# Patient Record
Sex: FEMALE | Race: WHITE | Employment: FULL TIME | URBAN - METROPOLITAN AREA
[De-identification: names, ages, dates, MRNs, and addresses within clinical notes are randomized per-mention and may not be internally consistent; named-entity substitution may affect disease eponyms.]

---

## 2017-05-11 LAB
HBSAG, EXTERNAL: NEGATIVE
HIV, EXTERNAL: NON REACTIVE
RUBELLA, EXTERNAL: NORMAL
TYPE, ABO & RH, EXTERNAL: NORMAL

## 2017-07-25 LAB
ANTIBODY SCREEN, EXTERNAL: NEGATIVE
T. PALLIDUM, EXTERNAL: NEGATIVE

## 2017-09-14 LAB — GRBS, EXTERNAL: NEGATIVE

## 2017-09-22 ENCOUNTER — HOSPITAL ENCOUNTER (EMERGENCY)
Age: 32
Discharge: HOME | End: 2017-09-22
Attending: OBSTETRICS & GYNECOLOGY | Admitting: OBSTETRICS & GYNECOLOGY
Payer: COMMERCIAL

## 2017-09-22 VITALS
RESPIRATION RATE: 14 BRPM | HEIGHT: 66 IN | TEMPERATURE: 98.4 F | SYSTOLIC BLOOD PRESSURE: 129 MMHG | BODY MASS INDEX: 24.43 KG/M2 | DIASTOLIC BLOOD PRESSURE: 85 MMHG | HEART RATE: 76 BPM | WEIGHT: 152 LBS

## 2017-09-22 PROCEDURE — 99283 EMERGENCY DEPT VISIT LOW MDM: CPT

## 2017-09-22 RX ORDER — SERTRALINE HYDROCHLORIDE 50 MG/1
50 TABLET, FILM COATED ORAL DAILY
COMMUNITY

## 2017-09-22 NOTE — IP AVS SNAPSHOT
[address] 
[phone] Patient: Chago Levin MRN: [id number] QXY:4[birth year] You are allergic to the following No active allergies Recent Documentation Height Weight Breastfeeding? BMI OB Status Smoking Status 1.676 m 68.9 kg No 24.53 kg/m2 Pregnant Never Smoker Emergency Contacts Name Discharge Info Relation Home Work Mobile Kole Welsh DISCHARGE CAREGIVER [3] Spouse [3] [phone] About your hospitalization You were admitted on:  N/A You last received care in the:  [facility] 3 LABOR & DELIVERY You were discharged on:  September 22, 2017 Unit phone number:  [phone] Why you were hospitalized Your primary diagnosis was:  Not on File Your diagnoses also included:  Pregnancy Providers Seen During Your Hospitalizations Provider Role Specialty Primary office phone Merline Dayhoff, MD Attending Provider Obstetrics & Gynecology [phone] Your Primary Care Physician (PCP) Primary Care Physician Office Phone Office Fax Silvana Quiroga [phone] Follow-up Information Follow up With Details Comments Contact Info Merline Dayhoff, MD   [address] [facility] 400 1701 [facility] 
[phone] Current Discharge Medication List  
  
CONTINUE these medications which have NOT CHANGED Dose & Instructions Dispensing Information Comments Morning Noon Evening Bedtime PRENATAL #2 PO Your last dose was: Your next dose is: Take  by mouth. Refills:  0 PROBIOTIC (S.BOULARDII) PO Your last dose was: Your next dose is: Take  by mouth. Refills:  0  
     
   
   
   
  
 ZOLOFT 50 mg tablet Generic drug:  sertraline Your last dose was: Your next dose is: Dose:  50 mg Take 50 mg by mouth daily. Refills:  0 STOP taking these medications   
 ibuprofen 600 mg tablet Commonly known as:  MOTRIN  
   
  
 omeprazole 10 mg capsule Commonly known as:  PRILOSEC  
   
  
 oxyCODONE-acetaminophen 5-325 mg per tablet Commonly known as:  PERCOCET Discharge Instructions Week 37 of Your Pregnancy: Care Instructions Your Care Instructions You are near the end of your pregnancyand you're probably pretty uncomfortable. It may be harder to walk around. Lying down probably isn't comfortable either. You may have trouble getting to sleep or staying asleep. Most women deliver their babies between 40 and 41 weeks. This is a good time to think about packing a bag for the hospital with items you'll need. Then you'll be ready when labor starts. Follow-up care is a key part of your treatment and safety. Be sure to make and go to all appointments, and call your doctor if you are having problems. It's also a good idea to know your test results and keep a list of the medicines you take. How can you care for yourself at home? Learn about breastfeeding · Breastfeeding is best for your baby and good for you. · Breast milk has antibodies to help your baby fight infections. · Mothers who breastfeed often lose weight faster, because making milk burns calories. · Learning the best ways to hold your baby will make breastfeeding easier. · Let your partner bathe and diaper the baby to keep your partner from feeling left out. Snuggle together when you breastfeed. · You may want to learn how to use a breast pump and store your milk. · If you choose to bottle feed, make the feeding feel like breastfeeding so you can bond with your baby. Always hold your baby and the bottle. Do not prop bottles or let your baby fall asleep with a bottle. Learn about crying · It is common for babies to cry for 1 to 3 hours a day. Some cry more, some cry less. · Babies don't cry to make you upset or because you are a bad parent. · Crying is how your baby communicates. Your baby may be hungry; have gas; need a diaper change; or feel cold, warm, tired, lonely, or tense. Sometimes babies cry for unknown reasons. · If you respond to your baby's needs, he or she will learn to trust you. · Try to stay calm when your baby cries. Your baby may get more upset if he or she senses that you are upset. Know how to care for your  · Your baby's umbilical cord stump will drop off on its own, usually between 1 and 2 weeks. To care for your baby's umbilical cord area: ¨ Clean the area at the bottom of the cord 2 or 3 times a day. ¨ Pay special attention to the area where the cord attaches to the skin. ¨ Keep the diaper folded below the cord. ¨ Use a damp washcloth or cotton ball to sponge bathe your baby until the stump has come off. · Your baby's first dark stool is called meconium. After the meconium is passed, your baby will develop his or her own bowel pattern. ¨ Some babies, especially  babies, have several bowel movements a day. Others have one or two a day, or one every 2 to 3 days. ¨  babies often have loose, yellow stools. Formula-fed babies have more formed stools. ¨ If your baby's stools look like little pellets, he or she is constipated. After 2 days of constipation, call your baby's doctor. · If your baby will be circumcised, you can care for him at home. ¨ Gently rinse his penis with warm water after every diaper change. Do not try to remove the film that forms on the penis. This film will go away on its own. Pat dry. ¨ Put petroleum ointment, such as Vaseline, on the area of the diaper that will touch your baby's penis. This will keep the diaper from sticking to your baby. ¨ Ask the doctor about giving your baby acetaminophen (Tylenol) for pain. Where can you learn more? Go to http://liu-erika.info/. Enter 68 21 97 in the search box to learn more about \"Week 37 of Your Pregnancy: Care Instructions. \" Current as of: March 16, 2017 Content Version: 11.3 © 5481-6140 [employer]. Care instructions adapted under license by [employer] (which disclaims liability or warranty for this information). If you have questions about a medical condition or this instruction, always ask your healthcare professional. [place] [address] any warranty or liability for your use of this information. Counting Your Baby's Kicks: Care Instructions Your Care Instructions Counting your baby's kicks is one way your doctor can tell that your baby is healthy. Most womenespecially in a first pregnancyfeel their baby move for the first time between 16 and 22 weeks. The movement may feel like flutters rather than kicks. Your baby may move more at certain times of the day. When you are active, you may notice less kicking than when you are resting. At your prenatal visits, your doctor will ask whether the baby is active. In your last trimester, your doctor may ask you to count the number of times you feel your baby move. Follow-up care is a key part of your treatment and safety. Be sure to make and go to all appointments, and call your doctor if you are having problems. It's also a good idea to know your test results and keep a list of the medicines you take. How do you count fetal kicks? · A common method of checking your baby's movement is to count the number of kicks or moves you feel in 1 hour. Ten movements (such as kicks, flutters, or rolls) in 1 hour are normal. Some doctors suggest that you count in the morning until you get to 10 movements. Then you can quit for that day and start again the next day. · Pick your baby's most active time of day to count. This may be any time from morning to evening. · If you do not feel 10 movements in an hour, your baby may be sleeping. Wait for the next hour and count again. When should you call for help? Call your doctor now or seek immediate medical care if: 
· You noticed that your baby has stopped moving or is moving much less than normal. 
Watch closely for changes in your health, and be sure to contact your doctor if you have any problems. Where can you learn more? Go to http://liu-erika.info/. Enter C981 in the search box to learn more about \"Counting Your Baby's Kicks: Care Instructions. \" Current as of: 2017 Content Version: 11.3 © 6931-6894 [employer]. Care instructions adapted under license by [employer] (which disclaims liability or warranty for this information). If you have questions about a medical condition or this instruction, always ask your healthcare professional. [place] [address] any warranty or liability for your use of this information.  Labor: Care Instructions Your Care Instructions  labor is the start of labor between 20 and 36 weeks of pregnancy. A full-term pregnancy lasts 37 to 42 weeks. In labor, the uterus contracts to open the cervix. This is the first stage of childbirth.  labor can be caused by a problem with the baby, the mother, or both. Often the cause is not known. In some cases, doctors use medicines to try to delay labor until 29 or more weeks of pregnancy. By this time, a baby has grown enough so that problems are not likely. In some casessuch as with a serious infectionit is healthier for the baby to be born early. Your treatment will depend on how far along you are in your pregnancy and on your health and your baby's health. Follow-up care is a key part of your treatment and safety.  Be sure to make and go to all appointments, and call your doctor if you are having problems. It's also a good idea to know your test results and keep a list of the medicines you take. How can you care for yourself at home? · If your doctor prescribed medicines, take them exactly as directed. Call your doctor if you think you are having a problem with your medicine. · Rest until your doctor advises you about activity. He or she will tell you if you should stay in bed most of the time. You may need to arrange for  if you have young children. · Do not have sexual intercourse unless your doctor says it is safe. · Use pads, not tampons, if you have vaginal bleeding. · Make sure to drink plenty of fluids. Dehydration can lead to contractions. If you have kidney, heart, or liver disease and have to limit fluids, talk with your doctor before you increase the amount of fluids you drink. · Do not smoke or allow others to smoke around you. If you need help quitting, talk to your doctor about stop-smoking programs and medicines. These can increase your chances of quitting for good. When should you call for help? Call 911 anytime you think you may need emergency care. For example, call if: 
· You passed out (lost consciousness). · You have severe vaginal bleeding. · You have severe pain in your belly or pelvis. · You have had fluid gushing or leaking from your vagina and you know or think the umbilical cord is bulging into your vagina. If this happens, immediately get down on your knees so your rear end (buttocks) is higher than your head. This will decrease the pressure on the cord until help arrives. Call your doctor now or seek immediate medical care if: 
· You have signs of preeclampsia, such as: 
¨ Sudden swelling of your face, hands, or feet. ¨ New vision problems (such as dimness or blurring). ¨ A severe headache. · You have any vaginal bleeding. · You have belly pain or cramping. · You have a fever. · You have had regular contractions (with or without pain) for an hour. This means that you have 6 or more within 1 hour after you change your position and drink fluids. · You have a sudden release of fluid from the vagina. · You have low back pain or pelvic pressure that does not go away. · You notice that your baby has stopped moving or is moving much less than normal. 
Watch closely for changes in your health, and be sure to contact your doctor if you have any problems. Where can you learn more? Go to http://liu-erika.info/. Enter Q400 in the search box to learn more about \" Labor: Care Instructions. \" Current as of: 2017 Content Version: 11.3 © 9685-9384 [employer] Care instructions adapted under license by [employer] (which disclaims liability or warranty for this information). If you have questions about a medical condition or this instruction, always ask your healthcare professional. [address] any warranty or liability for your use of this information. Discharge Orders None Introducing [facility]! [facility] introduces [employer] patient portal. Now you can access parts of your medical record, email your doctor's office, and request medication refills online. 1. In your internet browser, go to [email or web address]. Organically Maid/Shoot Extreme 2. Click on the First Time User? Click Here link in the Sign In box. You will see the New Member Sign Up page. 3. Enter your [employer] Access Code exactly as it appears below. You will not need to use this code after youve completed the sign-up process. If you do not sign up before the expiration date, you must request a new code. · [employer] Access Code: [email or web address] Expires: 2017  4:56 PM 
 
4. Enter the last four digits of your Social Security Number (xxxx) and Date of Birth (mm/dd/yyyy) as indicated and click Submit. You will be taken to the next sign-up page. 5. Create a [employer] ID. This will be your [employer] login ID and cannot be changed, so think of one that is secure and easy to remember. 6. Create a [employer] password. You can change your password at any time. 7. Enter your Password Reset Question and Answer. This can be used at a later time if you forget your password. 8. Enter your e-mail address. You will receive e-mail notification when new information is available in [address] 9. Click Sign Up. You can now view and download portions of your medical record. 10. Click the Download Summary menu link to download a portable copy of your medical information. If you have questions, please visit the Frequently Asked Questions section of the [employer] website. Remember, [employer] is NOT to be used for urgent needs. For medical emergencies, dial 911. Now available from your iPhone and Android! General Information Please provide this summary of care documentation to your next provider. Patient Signature:  ____________________________________________________________ Date:  ____________________________________________________________  
  
Peggy Quiroz Provider Signature:  ____________________________________________________________ Date:  ____________________________________________________________

## 2017-09-22 NOTE — H&P
History of Present Illness:  Pt is 28 yo  at 39 2/7 with cervix now 80/3/-2 and ucs every 3 min. GBS neg. SGA at 13%. Sent to L&D to r/o labor. 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 -       Previous Tobacco Use: Signed On - 2016  Smoked Tobacco Use:  Former smoker  Smokeless Tobacco Use:  Never     Counseled to quit/cut down:  no  Passive smoke exposure:  no  Drug use:  no  HIV high-risk behavior:  no  Caffeine use:  0 drinks per day    Previous Alcohol Use: Signed On - 2016  Alcohol use:  no  Exercise:  no  Seatbelt use:  100 %  Sun Exposure:  occasionally    Family History Risk Factors:     Family History of MI in females < 72years old:  no     Family History of MI in males < 54years old:  no    Dietary Counseling: no    PAP Smear History:     Date of Last PAP Smear:  2015      Review of Systems        See HPI    Except as noted in the HPI, the review of systems is negative for General, Breast, , Resp, GI, Endo, MS, Psych and Heme.       Vital Signs    Blood Pressure: 122 / 80    Weight:  152.2 pounds      Physical Exam     General           General appearance:  no acute distress    Head           Inspection:   normal    Eyes           External:   EOM intact    ENT           Dental:   adequate dentition    Chest           Lungs:  clear to auscultation          Heart:  regular rate and rhythm    Extremeties           Extremeties:  0 edema    Neurological           Reflexes:  2+ and symmetric with no pathological reflexes    Psych Orientation:  oriented to time, place, and person          Mood:  no appearance of anxiety, depression, or agitation    Lymph           Inguinal:  no inguinal adenopathy    Skin           Inspection:  no rashes, suspicious lesions, or ulcerations    Abdomen           Abdomen:  gravid          Fundal Height:  30    Pelvic Exam           EGBUS:  no lesions          Vagina:  normal appearing without lesions or discharge          Uterus:  gravid          Cervix:  no lesions or discharge                  Dilation: : 3                  Effacement:  80%                  Station:  -2                  Presentation:  cephalic    Allergies    This patient has no known allergies. Medications Removed from Medication List        Impression & Recommendations:    Problem # 1:  Normal pregnancy multigravida (ICD-V22.1) (LYK05-X11.71)  Pt is 26 yo  at 39 2/7 with cervix now 80/3/-2 and ucs every 3 min. GBS neg. SGA at 13%. Sent to L&D to r/o labor. Problem # 2:  Threatened PTL (KRW-354.92) (MSZ56-D77.03)  Pt is 26 yo  at 39 2/7 with cervix now 80/3/-2 and ucs every 3 min. GBS neg. SGA at 13%. Sent to L&D to r/o labor. Medications (at conclusion of this visit)    08/10/2017 ZOLOFT 50 MG ORAL TABS (SERTRALINE HCL) 1/2 tablet daily x1 wk. 1 tablet daily if sxs persist.  2017 PNV-DHA CAPS (PRENAT W/O M-YX-[id number]-FA-DHA CAPS)           LABORATORY DATA   TEST DATE RESULT   Group B Strep culture 2017 Negative                                   (Group B Strep Culture Result Field)   Blood Type 2017 A                                             (Blood Type Result Field)   Rh 2017 Positive                                   (Rh Result Field)   Rhogam Inj Given 2016 *   Tdap Vaccine Given 2017 Vacc.  [facility]   Antibody Screen 2017 Negative   Rubella  [facility] Reference Ranges On or After 3/10/14                  <0.90              Non-immune      0.90 - 0.99 Equivocal      >0.99              Immune    [facility] Reference Ranges  Before 3/10/14           <5                 Non-immune             5 - 9               Equivocal            >9                 Immune  [facility] Reference Ranges       < Or = 0.90       Negative             0.91-1.09          Equivocal            > Or = 1.10       Positive   05/11/2017     1.86     TPA (T Pallidum Antibodies) 07/25/2017 Negative   Serology (RPR) 06/23/2016 *   HBsAg 05/11/2017 Negative   HIV 05/11/2017 Non Reactive   Hemoglobin 07/25/2017 11.2   Hematocrit 07/25/2017 35.3   Platelets 04/12/0259 287 X10E3/UL   TSH 06/23/2016 *   Urine Culture 05/11/2017 Negative   GC DNA Probe 05/11/2017 Negative   Chlamydia DNA 05/11/2017 Negative   PAP 11/19/2015 NIL   Flu Vaccine Given 06/23/2016 *   HGBA1C 06/23/2016 *   HGB Electro     T4, Free 06/23/2016 *   BG Fasting 06/23/2016 *   GTT 1H 50G 07/25/2017 128   GTT 1H 100G 06/23/2016 *   GTT 2H 100G 06/23/2016 *   GTT 3H 100G 06/23/2016 *   Glucose Plasma 06/23/2016 *   CF Accept or Decline 11/19/2015 accepted   CF Screen Result 11/19/2015 Negative   Nuchal Trans 06/02/2017 3.99^3. 99 mm&millimeters   AFP Only 01/12/2016 *Screen Negative*   Tetra 05/11/2017 *Screen Negative*   AFP Serum 06/23/2016 *   CVS 06/23/2016 *   AFP Amniotic 06/23/2016 *   Amnio Karyo 06/23/2016 *   FISH 06/23/2016 *   GC Culture 06/23/2016 *   Chlamydia Cult 06/23/2016 *   Ureaplasma     Mycoplasma     WBC 05/11/2017 8.0 X10E3/UL   RBC 05/11/2017 3.85 X10E6/UL   MCV 05/11/2017 90   MCH 05/11/2017 29.1   MCHC RBC 05/11/2017 32.3     ULTRASOUND DATA   TEST DATE RESULT   Estimated Fetal Weight 09/14/2017 2240.81180499^2241 g&grams                                     Weight % 09/14/2017 13^13% %&percent                                                YENY 09/14/2017 9.8^9.8 cm&centimeters                    BPP 09/14/2017 8^8 [n/a]&Not applicable   Cervical Length (mm) 12/22/2015 39.000     ]      Electronically signed by Nicole Doherty [facility]  MD on 09/22/2017 at 2:58 PM    ________________________________________________________________________

## 2017-09-22 NOTE — IP AVS SNAPSHOT
[address] 
[phone] Patient: Caridad Miller MRN: [id number] CHRIS:[birth year] Current Discharge Medication List  
  
CONTINUE these medications which have NOT CHANGED Dose & Instructions Dispensing Information Comments Morning Noon Evening Bedtime PRENATAL #2 PO Your last dose was: Your next dose is: Take  by mouth. Refills:  0 PROBIOTIC (S.BOULARDII) PO Your last dose was: Your next dose is: Take  by mouth. Refills:  0  
     
   
   
   
  
 ZOLOFT 50 mg tablet Generic drug:  sertraline Your last dose was: Your next dose is:    
   
   
 Dose:  50 mg Take 50 mg by mouth daily. Refills:  0 STOP taking these medications   
 ibuprofen 600 mg tablet Commonly known as:  MOTRIN  
   
  
 omeprazole 10 mg capsule Commonly known as:  PRILOSEC  
   
  
 oxyCODONE-acetaminophen 5-325 mg per tablet Commonly known as:  PERCOCET

## 2017-09-22 NOTE — PROGRESS NOTES
1525 Patient presents to labor and delivery with complaints of painful, irregular contractions. Patient placed on the monitor. History, allergies, medications, and plan of care reviewed. 1545 Patient PO hydrating. Will continue to monitor. [address] Dr. Shayy Toure visits patient in the room. SVE done. Patient able to be discharged home. Patient received and verbalized understanding of discharge instructions. No questions or concerns at this time.

## 2017-09-22 NOTE — PROGRESS NOTES
Ante Partum Progress Note    Sara Se  89G2O    Assessment: 36w2d   Active Problems:    Pregnancy (2016)     ---r/o PTL, no cervical change over >6hours, occ painful UCs        Plan:  Dc home              Labor precautions reviewed      Patient states she has no new complaints. Good fetal mvm, no pih sxs    Orders/Charges: Medium and Non Stress Test        Vitals:  Visit Vitals    /85    Pulse 76    Temp 98.4 °F (36.9 °C)    Resp 14    Ht 5' 6\" (1.676 m)    Wt 68.9 kg (152 lb)    Breastfeeding No    BMI 24.53 kg/m2     Temp (24hrs), Av.4 °F (36.9 °C), Min:98.4 °F (36.9 °C), Max:98.4 °F (36.9 °C)    Patient Vitals for the past 24 hrs:   BP Temp Pulse Resp Height Weight   17 1637 129/85 - 76 - - -   17 1615 (!) 141/93 - 69 - - -   17 1606 131/88 - 73 - - -   17 1557 135/84 - 66 - - -   17 1542 152/85 98.4 °F (36.9 °C) 74 14 - -   17 1540 - - - - 5' 6\" (1.676 m) 68.9 kg (152 lb)   17 1535 (!) 142/99 - 80 - - -       Last 24hr Input/Output:  No intake or output data in the 24 hours ending 17 1654     Non stress test:  Reactive, An NST was performed and was reactive. The baseline FHR was 145 . Moderate baseline  variability was noted. Accelerations of sufficient amplitude and duration were noted. There were no decelerations noted. Patient Vitals for the past 4 hrs: Mode Fetal Heart Rate Variability Decelerations Accelerations RN Reviewed Strip? Non Stress Test   17 1600 External 145 6-25 BPM None Yes Yes Reactive   17 1533 External - - - - - -       Uterine Activity: Mild, Irregular occ  Moderate     Exam:  Patient without distress. Abdomen, fundus soft non-tender     Extremities, no redness or tenderness     No current facility-administered medications for this encounter.                     Additional Exam: Dilation: 2-3  cm, Effacement: 75%  Not Checked and Station: -2    Labs:     No results found for this or any previous visit (from the past 24 hour(s)). No results for input(s): GLUCPOC in the last 72 hours.     No lab exists for component: [place]

## 2017-09-22 NOTE — DISCHARGE INSTRUCTIONS
Week 37 of Your Pregnancy: Care Instructions  Your Care Instructions    You are near the end of your pregnancy--and you're probably pretty uncomfortable. It may be harder to walk around. Lying down probably isn't comfortable either. You may have trouble getting to sleep or staying asleep. Most women deliver their babies between 40 and 41 weeks. This is a good time to think about packing a bag for the hospital with items you'll need. Then you'll be ready when labor starts. Follow-up care is a key part of your treatment and safety. Be sure to make and go to all appointments, and call your doctor if you are having problems. It's also a good idea to know your test results and keep a list of the medicines you take. How can you care for yourself at home? Learn about breastfeeding  · Breastfeeding is best for your baby and good for you. · Breast milk has antibodies to help your baby fight infections. · Mothers who breastfeed often lose weight faster, because making milk burns calories. · Learning the best ways to hold your baby will make breastfeeding easier. · Let your partner bathe and diaper the baby to keep your partner from feeling left out. Snuggle together when you breastfeed. · You may want to learn how to use a breast pump and store your milk. · If you choose to bottle feed, make the feeding feel like breastfeeding so you can bond with your baby. Always hold your baby and the bottle. Do not prop bottles or let your baby fall asleep with a bottle. Learn about crying  · It is common for babies to cry for 1 to 3 hours a day. Some cry more, some cry less. · Babies don't cry to make you upset or because you are a bad parent. · Crying is how your baby communicates. Your baby may be hungry; have gas; need a diaper change; or feel cold, warm, tired, lonely, or tense. Sometimes babies cry for unknown reasons. · If you respond to your baby's needs, he or she will learn to trust you.   · Try to stay calm when your baby cries. Your baby may get more upset if he or she senses that you are upset. Know how to care for your   · Your baby's umbilical cord stump will drop off on its own, usually between 1 and 2 weeks. To care for your baby's umbilical cord area:  ¨ Clean the area at the bottom of the cord 2 or 3 times a day. ¨ Pay special attention to the area where the cord attaches to the skin. ¨ Keep the diaper folded below the cord. ¨ Use a damp washcloth or cotton ball to sponge bathe your baby until the stump has come off. · Your baby's first dark stool is called meconium. After the meconium is passed, your baby will develop his or her own bowel pattern. ¨ Some babies, especially  babies, have several bowel movements a day. Others have one or two a day, or one every 2 to 3 days. ¨  babies often have loose, yellow stools. Formula-fed babies have more formed stools. ¨ If your baby's stools look like little pellets, he or she is constipated. After 2 days of constipation, call your baby's doctor. · If your baby will be circumcised, you can care for him at home. ¨ Gently rinse his penis with warm water after every diaper change. Do not try to remove the film that forms on the penis. This film will go away on its own. Pat dry. ¨ Put petroleum ointment, such as Vaseline, on the area of the diaper that will touch your baby's penis. This will keep the diaper from sticking to your baby. ¨ Ask the doctor about giving your baby acetaminophen (Tylenol) for pain. Where can you learn more? Go to http://liu-erika.info/. Enter 68 21 [id number] in the search box to learn more about \"Week 37 of Your Pregnancy: Care Instructions. \"  Current as of: 2017  Content Version: 11.3  © 5795-8482 [employer]. Care instructions adapted under license by [employer] (which disclaims liability or warranty for this information).  If you have questions about a medical condition or this instruction, always ask your healthcare professional. [address] any warranty or liability for your use of this information. Counting Your Baby's Kicks: Care Instructions  Your Care Instructions  Counting your baby's kicks is one way your doctor can tell that your baby is healthy. Most women--especially in a first pregnancy--feel their baby move for the first time between 16 and 22 weeks. The movement may feel like flutters rather than kicks. Your baby may move more at certain times of the day. When you are active, you may notice less kicking than when you are resting. At your prenatal visits, your doctor will ask whether the baby is active. In your last trimester, your doctor may ask you to count the number of times you feel your baby move. Follow-up care is a key part of your treatment and safety. Be sure to make and go to all appointments, and call your doctor if you are having problems. It's also a good idea to know your test results and keep a list of the medicines you take. How do you count fetal kicks? · A common method of checking your baby's movement is to count the number of kicks or moves you feel in 1 hour. Ten movements (such as kicks, flutters, or rolls) in 1 hour are normal. Some doctors suggest that you count in the morning until you get to 10 movements. Then you can quit for that day and start again the next day. · Pick your baby's most active time of day to count. This may be any time from morning to evening. · If you do not feel 10 movements in an hour, your baby may be sleeping. Wait for the next hour and count again. When should you call for help? Call your doctor now or seek immediate medical care if:  · You noticed that your baby has stopped moving or is moving much less than normal.  Watch closely for changes in your health, and be sure to contact your doctor if you have any problems. Where can you learn more?   Go to http://liu-erika.info/. Enter B901 in the search box to learn more about \"Counting Your Baby's Kicks: Care Instructions. \"  Current as of: 2017  Content Version: 11.3  © 5403-1120 [employer]. Care instructions adapted under license by [employer] (which disclaims liability or warranty for this information). If you have questions about a medical condition or this instruction, always ask your healthcare professional. [address] any warranty or liability for your use of this information.  Labor: Care Instructions  Your Care Instructions   labor is the start of labor between 21 and 36 weeks of pregnancy. A full-term pregnancy lasts 37 to 42 weeks. In labor, the uterus contracts to open the cervix. This is the first stage of childbirth.  labor can be caused by a problem with the baby, the mother, or both. Often the cause is not known. In some cases, doctors use medicines to try to delay labor until 29 or more weeks of pregnancy. By this time, a baby has grown enough so that problems are not likely. In some cases--such as with a serious infection--it is healthier for the baby to be born early. Your treatment will depend on how far along you are in your pregnancy and on your health and your baby's health. Follow-up care is a key part of your treatment and safety. Be sure to make and go to all appointments, and call your doctor if you are having problems. It's also a good idea to know your test results and keep a list of the medicines you take. How can you care for yourself at home? · If your doctor prescribed medicines, take them exactly as directed. Call your doctor if you think you are having a problem with your medicine. · Rest until your doctor advises you about activity. He or she will tell you if you should stay in bed most of the time.  You may need to arrange for  if you have young children. · Do not have sexual intercourse unless your doctor says it is safe. · Use pads, not tampons, if you have vaginal bleeding. · Make sure to drink plenty of fluids. Dehydration can lead to contractions. If you have kidney, heart, or liver disease and have to limit fluids, talk with your doctor before you increase the amount of fluids you drink. · Do not smoke or allow others to smoke around you. If you need help quitting, talk to your doctor about stop-smoking programs and medicines. These can increase your chances of quitting for good. When should you call for help? Call 911 anytime you think you may need emergency care. For example, call if:  · You passed out (lost consciousness). · You have severe vaginal bleeding. · You have severe pain in your belly or pelvis. · You have had fluid gushing or leaking from your vagina and you know or think the umbilical cord is bulging into your vagina. If this happens, immediately get down on your knees so your rear end (buttocks) is higher than your head. This will decrease the pressure on the cord until help arrives. Call your doctor now or seek immediate medical care if:  · You have signs of preeclampsia, such as:  ¨ Sudden swelling of your face, hands, or feet. ¨ New vision problems (such as dimness or blurring). ¨ A severe headache. · You have any vaginal bleeding. · You have belly pain or cramping. · You have a fever. · You have had regular contractions (with or without pain) for an hour. This means that you have 6 or more within 1 hour after you change your position and drink fluids. · You have a sudden release of fluid from the vagina. · You have low back pain or pelvic pressure that does not go away. · You notice that your baby has stopped moving or is moving much less than normal.  Watch closely for changes in your health, and be sure to contact your doctor if you have any problems. Where can you learn more?   Go to http://liu-erika.info/. Enter Q400 in the search box to learn more about \" Labor: Care Instructions. \"  Current as of: 2017  Content Version: 11.3  © 9000-9261 [employer]. Care instructions adapted under license by [employer] (which disclaims liability or warranty for this information). If you have questions about a medical condition or this instruction, always ask your healthcare professional. [place] [address] any warranty or liability for your use of this information.

## 2017-09-30 ENCOUNTER — ANESTHESIA EVENT (OUTPATIENT)
Dept: LABOR AND DELIVERY | Age: 32
End: 2017-09-30
Payer: COMMERCIAL

## 2017-09-30 ENCOUNTER — HOSPITAL ENCOUNTER (INPATIENT)
Age: 32
LOS: 3 days | Discharge: HOME | End: 2017-10-03
Attending: OBSTETRICS & GYNECOLOGY | Admitting: OBSTETRICS & GYNECOLOGY
Payer: COMMERCIAL

## 2017-09-30 ENCOUNTER — ANESTHESIA (OUTPATIENT)
Dept: LABOR AND DELIVERY | Age: 32
End: 2017-09-30
Payer: COMMERCIAL

## 2017-09-30 PROBLEM — O26.893 ABDOMINAL PAIN DURING PREGNANCY IN THIRD TRIMESTER: Status: ACTIVE | Noted: 2017-09-30

## 2017-09-30 PROBLEM — R10.9 ABDOMINAL PAIN DURING PREGNANCY IN THIRD TRIMESTER: Status: ACTIVE | Noted: 2017-09-30

## 2017-09-30 LAB
ERYTHROCYTE [DISTWIDTH] IN BLOOD BY AUTOMATED COUNT: 14.1 % (ref 11.5–14.5)
HCT VFR BLD AUTO: 39 % (ref 35–47)
HGB BLD-MCNC: 13.2 G/DL (ref 11.5–16)
MCH RBC QN AUTO: 29.9 PG (ref 26–34)
MCHC RBC AUTO-ENTMCNC: 33.8 G/DL (ref 30–36.5)
MCV RBC AUTO: 88.2 FL (ref 80–99)
PLATELET # BLD AUTO: 263 K/UL (ref 150–400)
RBC # BLD AUTO: 4.42 M/UL (ref 3.8–5.2)
WBC # BLD AUTO: 12.5 K/UL (ref 3.6–11)

## 2017-09-30 PROCEDURE — 75410000002 HC LABOR FEE PER 1 HR

## 2017-09-30 PROCEDURE — 36415 COLL VENOUS BLD VENIPUNCTURE: CPT | Performed by: OBSTETRICS & GYNECOLOGY

## 2017-09-30 PROCEDURE — A4300 CATH IMPL VASC ACCESS PORTAL: HCPCS

## 2017-09-30 PROCEDURE — 77030014125 HC TY EPDRL BBMI -B: Performed by: ANESTHESIOLOGY

## 2017-09-30 PROCEDURE — 00HU33Z INSERTION OF INFUSION DEVICE INTO SPINAL CANAL, PERCUTANEOUS APPROACH: ICD-10-PCS | Performed by: ANESTHESIOLOGY

## 2017-09-30 PROCEDURE — 99282 EMERGENCY DEPT VISIT SF MDM: CPT

## 2017-09-30 PROCEDURE — 74011000250 HC RX REV CODE- 250: Performed by: OBSTETRICS & GYNECOLOGY

## 2017-09-30 PROCEDURE — 85027 COMPLETE CBC AUTOMATED: CPT | Performed by: OBSTETRICS & GYNECOLOGY

## 2017-09-30 PROCEDURE — 74011250636 HC RX REV CODE- 250/636: Performed by: ANESTHESIOLOGY

## 2017-09-30 PROCEDURE — 65270000029 HC RM PRIVATE

## 2017-09-30 PROCEDURE — 77030007880 HC KT SPN EPDRL BBMI -B

## 2017-09-30 PROCEDURE — 3E0R3BZ INTRODUCTION OF ANESTHETIC AGENT INTO SPINAL CANAL, PERCUTANEOUS APPROACH: ICD-10-PCS | Performed by: ANESTHESIOLOGY

## 2017-09-30 PROCEDURE — 74011250636 HC RX REV CODE- 250/636: Performed by: OBSTETRICS & GYNECOLOGY

## 2017-09-30 RX ORDER — FENTANYL/BUPIVACAINE/NS/PF 2-1250MCG
1-16 PREFILLED PUMP RESERVOIR EPIDURAL CONTINUOUS
Status: DISCONTINUED | OUTPATIENT
Start: 2017-09-30 | End: 2017-10-03 | Stop reason: HOSPADM

## 2017-09-30 RX ORDER — SODIUM CHLORIDE 0.9 % (FLUSH) 0.9 %
5-10 SYRINGE (ML) INJECTION EVERY 8 HOURS
Status: DISCONTINUED | OUTPATIENT
Start: 2017-09-30 | End: 2017-10-01 | Stop reason: HOSPADM

## 2017-09-30 RX ORDER — FENTANYL CITRATE 50 UG/ML
100 INJECTION, SOLUTION INTRAMUSCULAR; INTRAVENOUS ONCE
Status: DISCONTINUED | OUTPATIENT
Start: 2017-09-30 | End: 2017-10-01 | Stop reason: HOSPADM

## 2017-09-30 RX ORDER — SODIUM CHLORIDE, SODIUM LACTATE, POTASSIUM CHLORIDE, CALCIUM CHLORIDE 600; 310; 30; 20 MG/100ML; MG/100ML; MG/100ML; MG/100ML
125 INJECTION, SOLUTION INTRAVENOUS CONTINUOUS
Status: DISCONTINUED | OUTPATIENT
Start: 2017-09-30 | End: 2017-10-03 | Stop reason: HOSPADM

## 2017-09-30 RX ORDER — TERBUTALINE SULFATE 1 MG/ML
0.25 INJECTION SUBCUTANEOUS AS NEEDED
Status: DISCONTINUED | OUTPATIENT
Start: 2017-09-30 | End: 2017-09-30

## 2017-09-30 RX ORDER — BUPIVACAINE HYDROCHLORIDE 5 MG/ML
30 INJECTION, SOLUTION EPIDURAL; INTRACAUDAL AS NEEDED
Status: DISCONTINUED | OUTPATIENT
Start: 2017-09-30 | End: 2017-10-01 | Stop reason: HOSPADM

## 2017-09-30 RX ORDER — FENTANYL/BUPIVACAINE/NS/PF 2-1250MCG
1-17 PREFILLED PUMP RESERVOIR EPIDURAL CONTINUOUS
Status: DISCONTINUED | OUTPATIENT
Start: 2017-09-30 | End: 2017-10-01 | Stop reason: HOSPADM

## 2017-09-30 RX ORDER — NALBUPHINE HYDROCHLORIDE 10 MG/ML
10 INJECTION, SOLUTION INTRAMUSCULAR; INTRAVENOUS; SUBCUTANEOUS
Status: DISCONTINUED | OUTPATIENT
Start: 2017-09-30 | End: 2017-10-01 | Stop reason: HOSPADM

## 2017-09-30 RX ORDER — NALOXONE HYDROCHLORIDE 0.4 MG/ML
0.4 INJECTION, SOLUTION INTRAMUSCULAR; INTRAVENOUS; SUBCUTANEOUS AS NEEDED
Status: DISCONTINUED | OUTPATIENT
Start: 2017-09-30 | End: 2017-10-01 | Stop reason: HOSPADM

## 2017-09-30 RX ORDER — SODIUM CHLORIDE 0.9 % (FLUSH) 0.9 %
5-10 SYRINGE (ML) INJECTION AS NEEDED
Status: DISCONTINUED | OUTPATIENT
Start: 2017-09-30 | End: 2017-10-01 | Stop reason: HOSPADM

## 2017-09-30 RX ORDER — ACETAMINOPHEN 325 MG/1
TABLET ORAL
COMMUNITY

## 2017-09-30 RX ADMIN — SODIUM CHLORIDE, SODIUM LACTATE, POTASSIUM CHLORIDE, AND CALCIUM CHLORIDE 125 ML/HR: 600; 310; 30; 20 INJECTION, SOLUTION INTRAVENOUS at 17:40

## 2017-09-30 RX ADMIN — FENTANYL 0.2 MG/100ML-BUPIV 0.125%-NACL 0.9% EPIDURAL INJ 11 ML/HR: 2/0.125 SOLUTION at 22:48

## 2017-09-30 RX ADMIN — NALBUPHINE HYDROCHLORIDE 10 MG: 10 INJECTION, SOLUTION INTRAMUSCULAR; INTRAVENOUS; SUBCUTANEOUS at 18:08

## 2017-09-30 RX ADMIN — FAMOTIDINE 20 MG: 10 INJECTION, SOLUTION INTRAVENOUS at 23:35

## 2017-09-30 RX ADMIN — NALBUPHINE HYDROCHLORIDE 10 MG: 10 INJECTION, SOLUTION INTRAMUSCULAR; INTRAVENOUS; SUBCUTANEOUS at 20:52

## 2017-09-30 RX ADMIN — SODIUM CHLORIDE, SODIUM LACTATE, POTASSIUM CHLORIDE, AND CALCIUM CHLORIDE 125 ML/HR: 600; 310; 30; 20 INJECTION, SOLUTION INTRAVENOUS at 22:01

## 2017-09-30 NOTE — IP AVS SNAPSHOT
[address] 
[phone] Patient: Adelaide Garduno MRN: [id number] SON:[id number] Current Discharge Medication List  
  
START taking these medications Dose & Instructions Dispensing Information Comments Morning Noon Evening Bedtime  
 ibuprofen 600 mg tablet Commonly known as:  MOTRIN Your last dose was: Your next dose is:    
   
   
 Dose:  600 mg Take 1 Tab by mouth every eight (8) hours as needed for Pain. Quantity:  36 Tab Refills:  2  
     
   
   
   
  
 oxyCODONE-acetaminophen 5-325 mg per tablet Commonly known as:  PERCOCET Your last dose was: Your next dose is:    
   
   
 Dose:  1 Tab Take 1 Tab by mouth every four (4) hours as needed for Pain. Max Daily Amount: 6 Tabs. Quantity:  20 Tab Refills:  0 CONTINUE these medications which have NOT CHANGED Dose & Instructions Dispensing Information Comments Morning Noon Evening Bedtime PRENATAL #2 PO Your last dose was: Your next dose is: Take  by mouth. Refills:  0  
     
   
   
   
  
 TYLENOL 325 mg tablet Generic drug:  acetaminophen Your last dose was: Your next dose is: Take  by mouth every four (4) hours as needed for Pain. Indications: Pain Refills:  0  
     
   
   
   
  
 ZOLOFT 50 mg tablet Generic drug:  sertraline Your last dose was: Your next dose is:    
   
   
 Dose:  50 mg Take 50 mg by mouth daily. Refills:  0 Where to Get Your Medications Information on where to get these meds will be given to you by the nurse or doctor. ! Ask your nurse or doctor about these medications  
  ibuprofen 600 mg tablet  
 oxyCODONE-acetaminophen 5-325 mg per tablet

## 2017-09-30 NOTE — IP AVS SNAPSHOT
[address] [address] 
[phone] Patient: Simone De La Torre MRN: [id number] PZN:[id number] You are allergic to the following No active allergies Recent Documentation Breastfeeding? OB Status Smoking Status Unknown Recent pregnancy Never Smoker Unresulted Labs Order Current Status SAMPLE TO BLOOD BANK In process Emergency Contacts Name Discharge Info Relation Home Work Mobile Kole Welsh DISCHARGE CAREGIVER [3] Spouse [3] [phone] About your hospitalization You were admitted on:  September 30, 2017 You last received care in the:  [address] [facility] You were discharged on:  October 3, 2017 Unit phone number:  [phone] Why you were hospitalized Your primary diagnosis was:  Not on File Your diagnoses also included:  Abdominal Pain During Pregnancy In Third Trimester Providers Seen During Your Hospitalizations Provider Role Specialty Primary office phone Christiano Curiel MD Attending Provider Obstetrics & Gynecology [phone] Kaya Lynne MD Attending Provider Obstetrics & Gynecology [phone] Your Primary Care Physician (PCP) Primary Care Physician Office Phone Office Fax Rachel Masterson [phone] Follow-up Information Follow up With Details Comments Contact Info Kaya Lynne MD Schedule an appointment as soon as possible for a visit in 6 weeks Routine postpartum appointment [address] [place] [address] [address] [facility] 
[phone] Current Discharge Medication List  
  
START taking these medications Dose & Instructions Dispensing Information Comments Morning Noon Evening Bedtime  
 ibuprofen 600 mg tablet Commonly known as:  MOTRIN Your last dose was:     
   
Your next dose is:    
   
   
 Dose:  600 mg  
 Take 1 Tab by mouth every eight (8) hours as needed for Pain. Quantity:  36 Tab Refills:  2  
     
   
   
   
  
 oxyCODONE-acetaminophen 5-325 mg per tablet Commonly known as:  PERCOCET Your last dose was: Your next dose is:    
   
   
 Dose:  1 Tab Take 1 Tab by mouth every four (4) hours as needed for Pain. Max Daily Amount: 6 Tabs. Quantity:  20 Tab Refills:  0 CONTINUE these medications which have NOT CHANGED Dose & Instructions Dispensing Information Comments Morning Noon Evening Bedtime PRENATAL #2 PO Your last dose was: Your next dose is: Take  by mouth. Refills:  0  
     
   
   
   
  
 TYLENOL 325 mg tablet Generic drug:  acetaminophen Your last dose was: Your next dose is: Take  by mouth every four (4) hours as needed for Pain. Indications: Pain Refills:  0  
     
   
   
   
  
 ZOLOFT 50 mg tablet Generic drug:  sertraline Your last dose was: Your next dose is:    
   
   
 Dose:  50 mg Take 50 mg by mouth daily. Refills:  0 Where to Get Your Medications Information on where to get these meds will be given to you by the nurse or doctor. ! Ask your nurse or doctor about these medications  
  ibuprofen 600 mg tablet  
 oxyCODONE-acetaminophen 5-325 mg per tablet Discharge Instructions POSTPARTUM DISCHARGE INSTRUCTIONS Name:  Tammy Feliciano YOB: 1985 Admission Diagnosis:  Abdominal pain during pregnancy in third trimester Discharge Diagnosis:   
Problem List as of 10/3/2017  Date Reviewed: 10/1/2017 Codes Class Noted - Resolved Pregnancy ICD-10-CM: Z34.90 ICD-9-CM: V22.2  6/22/2016 - Present RESOLVED: Abdominal pain during pregnancy in third trimester ICD-10-CM: O26.893, R10.9 ICD-9-CM: 891.44, 789.00  9/30/2017 - 10/3/2017 Attending Physician:  Ad Garcia MD 
 
Delivery Type:  Vaginal Childbirth: What To Expect At Home Your Recovery: Your body will slowly heal in the next few weeks. It is easy to get too tired and overwhelmed during the first weeks after your baby is born. Changes in your hormones can shift your mood without warning. You may find it hard to meet the extra demands on your energy and time. Take it easy on yourself. Follow-up care is a key part of your treatment and safety. Be sure to make and go to all appointments, and call your doctor if you are having problems. It's also a good idea to know your test results and keep a list of the medicines you take. How can you care for yourself at home? Vaginal bleeding and cramps · After delivery, you will have a bloody discharge from the vagina. This will turn pink within a week and then white or yellow after about 10 days. It may last for 2 to 4 weeks or longer, until the uterus has healed. Use pads instead of tampons until you stop bleeding. · Do not worry if you pass some blood clots, as long as they are smaller than a golf ball. If you have a tear or stitches in your vaginal area, change the pad at least every 4 hours to prevent soreness and infection. · You may have cramps for the first few days after childbirth. These are normal and occur as the uterus shrinks to normal size. Take an over-the-counter pain medicine, such as acetaminophen (Tylenol), ibuprofen (Advil, Motrin), or naproxen (Aleve), for cramps. Read and follow all instructions on the label. Do not take aspirin, because it can cause more bleeding. Do not take acetaminophen (Tylenol) and other acetaminophen containing medications (i.e. Percocet) at the same time. Breast fullness · Your breasts may overfill (engorge) in the first few days after delivery. To help milk flow and to relieve pain, warm your breasts in the shower or by using warm, moist towels before nursing. · If you are not nursing, do not put warmth on your breasts or touch your breasts. Wear a tight bra or sports bra and use ice until the fullness goes away. This usually takes 2 to 3 days. · Put ice or a cold pack on your breast after nursing to reduce swelling and pain. Put a thin cloth between the ice and your skin. Activity · Eat a balanced diet. Do not try to lose weight by cutting calories. Keep taking your prenatal vitamins, or take a multivitamin. · Get as much rest as you can. Try to take naps when your baby sleeps during the day. · Get some exercise every day. But do not do any heavy exercise until your doctor says it is okay. · Wait until you are healed (about 4 to 6 weeks) before you have sexual intercourse. Your doctor will tell you when it is okay to have sex. · Talk to your doctor about birth control. You can get pregnant even before your period returns. Also, you can get pregnant while you are breast-feeding. Mental Health · Many women get the \"baby blues\" during the first few days after childbirth. You may lose sleep, feel irritable, and cry easily. You may feel happy one minute and sad the next. Hormone changes are one cause of these emotional changes. Also, the demands of a new baby, along with visits from relatives or other family needs, add to a mother's stress. The \"baby blues\" often peak around the fourth day. Then they ease up in less than 2 weeks. · If your moodiness or anxiety lasts for more than 2 weeks, or if you feel like life is not worth living, you may have postpartum depression. This is different for each mother. Some mothers with serious depression may worry intensely about their infant's well-being. Others may feel distant from their child. Some mothers might even feel that they might harm their baby. A mother may have signs of paranoia, wondering if someone is watching her.  
· With all the changes in your life, you may not know if you are depressed. Pregnancy sometimes causes changes in how you feel that are similar to the symptoms of depression. · Symptoms of depression include: · Feeling sad or hopeless and losing interest in daily activities. These are the most common symptoms of depression. · Sleeping too much or not enough. · Feeling tired. You may feel as if you have no energy. · Eating too much or too little. · [employer] (PSI) offers a Warm line; Chat with the Expert phone sessions; Information and Articles about Pregnancy and Postpartum Mood Disorders; Comprehensive List of Free Support Groups; Knowledgeable local coordinators who will offer support, information, and resources; Guide to Resources on [employer] Calendar of events in the  mood disorders community; Latest News and Research; and [facility] [address] for [employer]. Remember - You are not alone; You are not to blame; With help, you will be well. 3-143-065-PPD(1173). WWW. POSTPARTUM. NET · Writing or talking about death, such as writing suicide notes or talking about guns, knives, or pills. Keep the numbers for these national suicide hotlines: 0-440-916-TALK (8-279-452-1043) and 2-402-KKVITZZ (6-275.751.6898). If you or someone you know talks about suicide or feeling hopeless, get help right away. Constipation and Hemorrhoids · Drink plenty of fluids, enough so that your urine is light yellow or clear like water. If you have kidney, heart, or liver disease and have to limit fluids, talk with your doctor before you increase the amount of fluids you drink. · Eat plenty of fiber each day. Have a bran muffin or bran cereal for breakfast, and try eating a piece of fruit for a mid-afternoon snack. · For painful, itchy hemorrhoids, put ice or a cold pack on the area several times a day for 10 minutes at a time. Follow this by putting a warm compress on the area for another 10 to 20 minutes or by sitting in a shallow, warm bath. When should you call for help? Call 911 anytime you think you may need emergency care. For example, call if: 
· You are thinking of hurting yourself, your baby, or anyone else. · You passed out (lost consciousness). · You have symptoms of a blood clot in your lung (called a pulmonary embolism). These may include:   
· Sudden chest pain. · Trouble breathing. · Coughing up blood. Call your doctor now or seek immediate medical care if: 
· You have severe vaginal bleeding. · You are soaking through a pad each hour for 2 or more hours. · Your vaginal bleeding seems to be getting heavier or is still bright red 4 days after delivery. · You are dizzy or lightheaded, or you feel like you may faint. · You are vomiting or cannot keep fluids down. · You have a fever. · You have new or more belly pain. · You pass tissue (not just blood). · Your vaginal discharge smells bad. · Your belly feels tender or full and hard. · Your breasts are continuously painful or red. · You feel sad, anxious, or hopeless for more than a few days. · You have sudden, severe pain in your belly. · You have symptoms of a blood clot in your leg (called a deep vein thrombosis),  
       such as: 
· Pain in your calf, back of the knee, thigh, or groin. · Redness and swelling in your leg or groin. · You have symptoms of preeclampsia, such as: 
· Sudden swelling of your face, hands, or feet. · New vision problems (such as dimness or blurring). · A severe headache. · Your blood pressure is higher than it should be or rises suddenly. · You have new nausea or vomiting. Watch closely for changes in your health, and be sure to contact your doctor if you have any problems. Additional Information:  Postpartum Support PARENTS:  Are you feeling sad or depressed? Is it difficult for you to enjoy yourself? Do you feel more irritable or tense?  Do you feel anxious or panicky? Are you having difficulty bonding with your baby? Do you feel as if you are \"out of control\" or \"going crazy\"? Are you worried that you might hurt your baby or yourself? FAMILIES: Do you worry that something is wrong but don't know how to help? Do you think that your partner or spouse is having problems coping? Are you worried that it may never get better? While many women experience some mild mood change or \"the blues\" during or after the birth of a child, 1 in 9 women experience more significant symptoms of depression or anxiety. 1 in 10 Dads become depressed during the first year. Things you can do Being a good parent includes taking care of yourself. If you take care of yourself, you will be able to take better care of your baby and your family. · Talk to a counselor or healthcare provider who has training in  mood and anxiety problems. · Learn as much as you can about pregnancy and postpartum depression and anxiety. · Get support from family and friends. Ask for help when you need it. · Join a support group in your area or online. · Keep active by walking, stretching or whatever form of exercise helps you to feel better. · Get enough rest and time for yourself. · Eat a healthy diet. · Don't give up! It may take more than one try to get the right help you need. These are general instructions for a healthy lifestyle: No smoking/ No tobacco products/ Avoid exposure to second hand smoke Surgeon General's Warning:  Quitting smoking now greatly reduces serious risk to your health. Obesity, smoking, and sedentary lifestyle greatly increases your risk for illness A healthy diet, regular physical exercise & weight monitoring are important for maintaining a healthy lifestyle Recognize signs and symptoms of STROKE: 
 
F-face looks uneven A-arms unable to move or move unevenly S-speech slurred or non-existent T-time-call 911 as soon as signs and symptoms begin - DO NOT go  
    back to bed or wait to see if you get better - TIME IS BRAIN. I have had the opportunity to make my options or choices for discharge. I have received and understand these instructions. Discharge Orders None [employer] Announcement We are excited to announce that we are making your provider's discharge notes available to you in [employer]. You will see these notes when they are completed and signed by the physician that discharged you from your recent hospital stay. If you have any questions or concerns about any information you see in [employer], please call the Health Information Department where you were seen or reach out to your Primary Care Provider for more information about your plan of care. Introducing [facility]! Cruz Elmore introduces [employer] patient portal. Now you can access parts of your medical record, email your doctor's office, and request medication refills online. 1. In your internet browser, go to [email or web address] 2. Click on the First Time User? Click Here link in the Sign In box. You will see the New Member Sign Up page. 3. Enter your [employer] Access Code exactly as it appears below. You will not need to use this code after youve completed the sign-up process. If you do not sign up before the expiration date, you must request a new code. · [employer] Access Code: [email or web address] Expires: 12/21/2017  4:56 PM 
 
4. Enter the last four digits of your Social Security Number (xxxx) and Date of Birth (mm/dd/yyyy) as indicated and click Submit. You will be taken to the next sign-up page. 5. Create a [employer] ID. This will be your [employer] login ID and cannot be changed, so think of one that is secure and easy to remember. 6. Create a [employer] password. You can change your password at any time. 7. Enter your Password Reset Question and Answer.  This can be used at a later time if you forget your password. 8. Enter your e-mail address. You will receive e-mail notification when new information is available in [address] 9. Click Sign Up. You can now view and download portions of your medical record. 10. Click the Download Summary menu link to download a portable copy of your medical information. If you have questions, please visit the Frequently Asked Questions section of the [employer] website. Remember, [employer] is NOT to be used for urgent needs. For medical emergencies, dial 911. Now available from your iPhone and Android! General Information Please provide this summary of care documentation to your next provider. Patient Signature:  ____________________________________________________________ Date:  ____________________________________________________________  
  
Marti Newcomer Provider Signature:  ____________________________________________________________ Date:  ____________________________________________________________

## 2017-09-30 NOTE — PROGRESS NOTES
1610 G 3 P 1 pt of Dr Ivan Garza for Dr Calljennifer Dalton received to labor and delivery #^ to rule out labor. Pt states that she has been jae since this am but more painful and every 5 minutes since 1400. Denies ROM or bloody show. Pt denies pregnancy complications except that her baby is measuring small. Follow up scheduled for Monday. GBS negative. [address] Ivan Garza notified of patient's arrival, contraction pattern, and OB assessment. Will be out to evaluate. [address] Dr Ivan Garza in to evaluate patient. SVE by him. 4+/80/-2. To admit. May have IV pain medicine now and epidural PRN  1808 Nubain given  1935 Bedside and Verbal shift change report given to Bebeto Julio RN (oncoming nurse) by Raleigh Black RN (offgoing nurse). Report included the following information SBAR, Kardex, MAR and [employer]ion.

## 2017-09-30 NOTE — PROGRESS NOTES
Bedside and Verbal shift change report given to GRETCHEN Mao by Laxmi Mejia. Burke Conklin RN. Report included the following information SBAR, Intake/Output and MAR.  Dr. Antonio Vazquez at the bedside. Reviewing FHT and discussing plan of care. SVE 5-5.5cm.  Patient requesting epidural. Bolus started.  Patient up to bathroom. 65 Dr. Val Boeck at the bedside for epidural placement. 12 Dr. Antonio Vazquez updated on patient status and current strip of contractions less frequent. No orders to start Pitocin at this time. Patient requesting something for heartburn. Orders received for IV Pepcid 20mg. 0215 Patient straight cath for 600mL of clear yellow urine. 0341 SVE 10/100/+2.    0342 Dr. Antonio Vazquez notified of patient recent exam and plan to begin Joylene Amis Dr. Antonio Vazquez at the bedside for delivery. 9427 Begin pushing.    0356  of liveborn female infant. 7767 TRANSFER - OUT REPORT:    Verbal report given to ARABELLA Garrido RN on [employer]  being transferred to MIU for routine progression of care       Report consisted of patients Situation, Background, Assessment and   Recommendations(SBAR). Information from the following report(s) SBAR, Intake/Output and MAR was reviewed with the receiving nurse. Lines:   Peripheral IV 17 Left Arm (Active)   Site Assessment Clean, dry, & intact 10/1/2017  4:23 AM   Phlebitis Assessment 0 10/1/2017  4:23 AM   Infiltration Assessment 0 10/1/2017  4:23 AM   Dressing Status Clean, dry, & intact 10/1/2017  4:23 AM   Dressing Type Tape;Transparent 10/1/2017  4:23 AM   Hub Color/Line Status Infusing 10/1/2017  4:23 AM   Alcohol Cap Used Yes 2017  5:52 PM        Opportunity for questions and clarification was provided.       Patient transported with:   Registered Nurse

## 2017-10-01 PROCEDURE — 65410000002 HC RM PRIVATE OB

## 2017-10-01 PROCEDURE — 74011250636 HC RX REV CODE- 250/636: Performed by: OBSTETRICS & GYNECOLOGY

## 2017-10-01 PROCEDURE — 75410000003 HC RECOV DEL/VAG/CSECN EA 0.5 HR

## 2017-10-01 PROCEDURE — 77030031139 HC SUT VCRL2 J&J -A

## 2017-10-01 PROCEDURE — 0KQM0ZZ REPAIR PERINEUM MUSCLE, OPEN APPROACH: ICD-10-PCS | Performed by: OBSTETRICS & GYNECOLOGY

## 2017-10-01 PROCEDURE — 76060000078 HC EPIDURAL ANESTHESIA

## 2017-10-01 PROCEDURE — 74011250637 HC RX REV CODE- 250/637: Performed by: OBSTETRICS & GYNECOLOGY

## 2017-10-01 PROCEDURE — 74011250636 HC RX REV CODE- 250/636

## 2017-10-01 PROCEDURE — 75410000000 HC DELIVERY VAGINAL/SINGLE

## 2017-10-01 PROCEDURE — 75410000002 HC LABOR FEE PER 1 HR

## 2017-10-01 RX ORDER — OXYCODONE AND ACETAMINOPHEN 5; 325 MG/1; MG/1
2 TABLET ORAL
Status: DISCONTINUED | OUTPATIENT
Start: 2017-10-01 | End: 2017-10-03 | Stop reason: HOSPADM

## 2017-10-01 RX ORDER — DIPHENHYDRAMINE HCL 25 MG
25 CAPSULE ORAL
Status: DISCONTINUED | OUTPATIENT
Start: 2017-10-01 | End: 2017-10-03 | Stop reason: HOSPADM

## 2017-10-01 RX ORDER — DOCUSATE SODIUM 100 MG/1
100 CAPSULE, LIQUID FILLED ORAL
Status: DISCONTINUED | OUTPATIENT
Start: 2017-10-01 | End: 2017-10-03 | Stop reason: HOSPADM

## 2017-10-01 RX ORDER — AMMONIA 15 % (W/V)
1 AMPUL (EA) INHALATION AS NEEDED
Status: DISCONTINUED | OUTPATIENT
Start: 2017-10-01 | End: 2017-10-03 | Stop reason: HOSPADM

## 2017-10-01 RX ORDER — SERTRALINE HYDROCHLORIDE 50 MG/1
50 TABLET, FILM COATED ORAL DAILY
Status: DISCONTINUED | OUTPATIENT
Start: 2017-10-01 | End: 2017-10-03 | Stop reason: HOSPADM

## 2017-10-01 RX ORDER — SIMETHICONE 80 MG
80 TABLET,CHEWABLE ORAL
Status: DISCONTINUED | OUTPATIENT
Start: 2017-10-01 | End: 2017-10-03 | Stop reason: HOSPADM

## 2017-10-01 RX ORDER — OXYTOCIN IN 5 % DEXTROSE 30/500 ML
PLASTIC BAG, INJECTION (ML) INTRAVENOUS
Status: COMPLETED
Start: 2017-10-01 | End: 2017-10-01

## 2017-10-01 RX ORDER — HYDROCORTISONE 1 %
CREAM (GRAM) TOPICAL AS NEEDED
Status: DISCONTINUED | OUTPATIENT
Start: 2017-10-01 | End: 2017-10-03 | Stop reason: HOSPADM

## 2017-10-01 RX ORDER — ONDANSETRON 4 MG/1
4 TABLET, ORALLY DISINTEGRATING ORAL
Status: DISCONTINUED | OUTPATIENT
Start: 2017-10-01 | End: 2017-10-03 | Stop reason: HOSPADM

## 2017-10-01 RX ORDER — OXYCODONE AND ACETAMINOPHEN 5; 325 MG/1; MG/1
1 TABLET ORAL
Status: DISCONTINUED | OUTPATIENT
Start: 2017-10-01 | End: 2017-10-01

## 2017-10-01 RX ORDER — HYDROCODONE BITARTRATE AND ACETAMINOPHEN 5; 325 MG/1; MG/1
1 TABLET ORAL
Status: DISCONTINUED | OUTPATIENT
Start: 2017-10-01 | End: 2017-10-01

## 2017-10-01 RX ORDER — SODIUM CHLORIDE 0.9 % (FLUSH) 0.9 %
5-10 SYRINGE (ML) INJECTION AS NEEDED
Status: DISCONTINUED | OUTPATIENT
Start: 2017-10-01 | End: 2017-10-03 | Stop reason: HOSPADM

## 2017-10-01 RX ORDER — IBUPROFEN 400 MG/1
800 TABLET ORAL EVERY 8 HOURS
Status: DISCONTINUED | OUTPATIENT
Start: 2017-10-01 | End: 2017-10-03 | Stop reason: HOSPADM

## 2017-10-01 RX ORDER — HYDROCORTISONE ACETATE PRAMOXINE HCL 2.5; 1 G/100G; G/100G
CREAM TOPICAL AS NEEDED
Status: DISCONTINUED | OUTPATIENT
Start: 2017-10-01 | End: 2017-10-03 | Stop reason: HOSPADM

## 2017-10-01 RX ORDER — SODIUM CHLORIDE 0.9 % (FLUSH) 0.9 %
5-10 SYRINGE (ML) INJECTION EVERY 8 HOURS
Status: DISCONTINUED | OUTPATIENT
Start: 2017-10-01 | End: 2017-10-03 | Stop reason: HOSPADM

## 2017-10-01 RX ORDER — OXYTOCIN/RINGER'S LACTATE 20/1000 ML
125-1000 PLASTIC BAG, INJECTION (ML) INTRAVENOUS AS NEEDED
Status: DISCONTINUED | OUTPATIENT
Start: 2017-10-01 | End: 2017-10-03 | Stop reason: HOSPADM

## 2017-10-01 RX ORDER — HYDROCODONE BITARTRATE AND ACETAMINOPHEN 7.5; 325 MG/1; MG/1
1 TABLET ORAL
Status: DISCONTINUED | OUTPATIENT
Start: 2017-10-01 | End: 2017-10-01

## 2017-10-01 RX ADMIN — DOCUSATE SODIUM 100 MG: 100 CAPSULE, LIQUID FILLED ORAL at 21:31

## 2017-10-01 RX ADMIN — SERTRALINE HYDROCHLORIDE 50 MG: 50 TABLET ORAL at 14:21

## 2017-10-01 RX ADMIN — OXYCODONE HYDROCHLORIDE AND ACETAMINOPHEN 1 TABLET: 5; 325 TABLET ORAL at 21:25

## 2017-10-01 RX ADMIN — OXYCODONE HYDROCHLORIDE AND ACETAMINOPHEN 1 TABLET: 5; 325 TABLET ORAL at 12:52

## 2017-10-01 RX ADMIN — OXYCODONE HYDROCHLORIDE AND ACETAMINOPHEN 1 TABLET: 5; 325 TABLET ORAL at 17:10

## 2017-10-01 RX ADMIN — HYDROCODONE BITARTRATE AND ACETAMINOPHEN 1 TABLET: 7.5; 325 TABLET ORAL at 06:38

## 2017-10-01 RX ADMIN — Medication 30000 MILLI-UNITS: at 04:03

## 2017-10-01 RX ADMIN — IBUPROFEN 800 MG: 400 TABLET, FILM COATED ORAL at 13:55

## 2017-10-01 RX ADMIN — IBUPROFEN 800 MG: 400 TABLET, FILM COATED ORAL at 05:30

## 2017-10-01 RX ADMIN — SODIUM CHLORIDE, SODIUM LACTATE, POTASSIUM CHLORIDE, AND CALCIUM CHLORIDE 125 ML/HR: 600; 310; 30; 20 INJECTION, SOLUTION INTRAVENOUS at 03:07

## 2017-10-01 RX ADMIN — IBUPROFEN 800 MG: 400 TABLET, FILM COATED ORAL at 22:50

## 2017-10-01 RX ADMIN — OXYCODONE HYDROCHLORIDE AND ACETAMINOPHEN 1 TABLET: 5; 325 TABLET ORAL at 23:20

## 2017-10-01 NOTE — ANESTHESIA PREPROCEDURE EVALUATION
Anesthetic History   No history of anesthetic complications            Review of Systems / Medical History  Patient summary reviewed, nursing notes reviewed and pertinent labs reviewed    Pulmonary  Within defined limits                 Neuro/Psych   Within defined limits           Cardiovascular  Within defined limits                Exercise tolerance: >4 METS     GI/Hepatic/Renal  Within defined limits              Endo/Other  Within defined limits           Other Findings              Physical Exam    Airway  Mallampati: II  TM Distance: > 6 cm  Neck ROM: normal range of motion   Mouth opening: Normal     Cardiovascular  Regular rate and rhythm,  S1 and S2 normal,  no murmur, click, rub, or gallop             Dental  No notable dental hx       Pulmonary  Breath sounds clear to auscultation               Abdominal  GI exam deferred       Other Findings            Anesthetic Plan    ASA: 2  Anesthesia type: epidural      Post-op pain plan if not by surgeon: indwelling epidural catheter    Induction: Intravenous  Anesthetic plan and risks discussed with: Patient and Spouse

## 2017-10-01 NOTE — PROGRESS NOTES
Labor Progress Note  Patient seen, fetal heart rate and contraction pattern evaluated. Physical Exam:  Cervical Exam: 5.5cm  Membranes:  Intact  Uterine Activity: Frequency: regular  Fetal Heart Rate: Reactive  Accelerations: yes  Decelerations: none  Uterine contractions: regular    Assessment/Plan:  Reassuring fetal status. Will continue to observe overnight for labor.         Raúl Sanon MD

## 2017-10-01 NOTE — LACTATION NOTE
This note was copied from a baby's chart. Mother reports baby has latched some and is making progress today. Mother and baby are resting at time of visit. She agrees to call for assistance as needed.

## 2017-10-01 NOTE — L&D DELIVERY NOTE
This patient was 30 or more weeks gestation at the time of [facility] go-live. For complete information pertaining to this patient's pregnancy, please refer to the paper chart and ACOG form. Delivery Note    Obstetrician:  Robbie Lopez MD    Assistant: none    Pre-Delivery Diagnosis: Term pregnancy    Post-Delivery Diagnosis: Female    Intrapartum Event: None    Procedure: Spontaneous vaginal delivery    Epidural: YES    Monitor:  Fetal Heart Tones - External and Uterine Contractions - External    Indications for instrumental delivery: none    Estimated Blood Loss: 250    Episiotomy: none    Laceration(s):  2nd degree    Laceration(s) repair: YES    Presentation: Cephalic    Fetal Description: billy    Fetal Position: Occiput Anterior    Birth Weight: pending    Birth Length: pending    Apgar - One Minute: 8    Apgar - Five Minutes: 9    Umbilical Cord: 3 vessels present    Specimens: none           Complications:  none           Cord Blood Results:   Information for the patient's :  Derek Downs [[id number]]   No results found for: PCTABR, ABORH, PCTDIG, BILI, ABORH, ABORHEXT    Prenatal Labs:     Lab Results   Component Value Date/Time    ABO,Rh A Positive 2017    HBsAg, External Negative 2017    HIV, External Non Reactive 2017    Rubella, External Immune 2017    Chlamydia, External Negative 2015    GrBStrep, External Negative 2017        Attending Attestation: I performed the procedure    Signed By:  Robbie Lopez MD     2017

## 2017-10-01 NOTE — ANESTHESIA POSTPROCEDURE EVALUATION
Post-Anesthesia Evaluation and Assessment    Patient: Zafar Donaldson MRN: [id number]  SSN: [id number]    YOB: 1985  Age: 27 y.o. Sex: female       Cardiovascular Function/Vital Signs  Visit Vitals    /89 (BP 1 Location: Right arm, BP Patient Position: At rest)    Pulse 70    Temp 36.8 °C (98.3 °F)    Resp 14    SpO2 98%    Breastfeeding Unknown       Patient is status post epidural anesthesia for * No procedures listed *. Nausea/Vomiting: None    Postoperative hydration reviewed and adequate. Pain:  Pain Scale 1: Numeric (0 - 10) (10/01/17 0241)  Pain Intensity 1: 7 (10/01/17 0687)   Managed    Neurological Status:   Neuro (WDL): Within Defined Limits (10/01/17 6451)   At baseline    Mental Status and Level of Consciousness: Arousable    Pulmonary Status:   O2 Device: Room air (10/01/17 9929)   Adequate oxygenation and airway patent    Complications related to anesthesia: None    Post-anesthesia assessment completed.  No concerns    Signed By: Eliz Zeng MD     October 1, 2017

## 2017-10-01 NOTE — H&P
EDC:10/18/2017  EGA: 37 weeks, 3 days      History of Present Illness:  Patient present to L&D for evaluation of Labor. She reports no LOF, Or VB, Positve FM      Patient's Prenatal Care with Doctor of Record Lashaun Bowen MD Notable For -    Threatened PTL  Size less than dates_() 13%_repeat at 37wks_  Late/insufficient prenatal care  Echogenic bowel, resolved  Normal pregnancy multigravida, GIRL! !  *Note: Parvo immune 11/15          Impression & Recommendations:    Problem # 1:  abdominal Pain Pregnancy  Cervix 4.5 cm /80/-2 with painful contractions c/w active labor    Problem # 2:  Threatened PTL (ICD-644.03) (IJO07-C75.03)    Problem # 3:  Size less than dates_() 13%_repeat at 37wks_ (EDF-562.20) (FDS77-W30.5930)    Problem # 4:  Late/insufficient prenatal care (ICD-V23.7) (VJI22-D73.33)    Problem # 5:  Echogenic bowel, resolved (BONE-852.17) (SSA10-B59.8xx0)    Problem # 6:  Normal pregnancy multigravida, GIRL!! (ICD-V22.1) (JLL65-C32.64)    Problem # 7:  *Note: Parvo immune 11/15 (BLV-433.00) (KMP08-J10.89)      Past Pregnancy History      :  3     Term Births:  1     Premature Births: 0     Living Children: 1     Para:   1     Mult. Births:  0     Prev : 0     Prev.  attempt? 0     Aborta:  1     Elect. Ab:  1     Spont.  Ab:  0     Ectopics:  0    Pregnancy # 1     Comments:  TAB    Pregnancy # 2     Delivery date:   2016     Weeks Gestation: 39 2/7     Delivery type:        Delivery location:   [facility]     Infant Sex:  Male     Birth weight:  3.94 kg     Comments:  Parminder Julain, apgars 8/9, avulsed cord, 2nd degree laceration, desires circ    Pregnancy # 3     Comments:  Current         Past Medical History:     Reviewed history from 2015 and no changes required:        negative     Past Surgical History:     Reviewed history from 2016 and no changes required:        wisdom teeth        [id number], , Male, [facility]    Family History Summary:      Reviewed history Last on 09/22/2017 and no changes required:09/30/2017  MGM - Has Family History of Breast Cancer - Entered On: 11/19/2015  PGM - Has Family History of Other Cancer - brain tumor - Entered On: 11/19/2015      Social History:     Reviewed history from 11/19/2015 and no changes required:                 Professional -       Previous Tobacco Use: Signed On - 08/09/2016  Smoked Tobacco Use:  Former smoker  Smokeless Tobacco Use:  Never     Counseled to quit/cut down:  no  Passive smoke exposure:  no  Drug use:  no  HIV high-risk behavior:  no  Caffeine use:  0 drinks per day    Previous Alcohol Use: Signed On - 08/09/2016  Alcohol use:  no  Exercise:  no  Seatbelt use:  100 %  Sun Exposure:  occasionally    Family History Risk Factors:     Family History of MI in females < 72years old:  no     Family History of MI in males < 54years old:  no    Dietary Counseling: no    PAP Smear History:     Date of Last PAP Smear:  11/19/2015      Review of Systems        See HPI    Allergies      Medications Removed from Medication List        Flowsheet View for Follow-up Visit     Estimated weeks of        gestation:  37 3/7     Weight:  153.0     Blood pressure: 120 / 78     Fetal position:  cephalic     Cx Dilation:  4cm     Cx Effacement: 80%     Cx Station:  -2      Vital Signs    Blood Pressure: 120 / 66  FHT Descrip:    good BTBV       - Additional FHT Comments: negative NST  Contractions:  yes  UC Frequency:  q 5 min    UC Intensity:  moderate   NST:   reactive     Height:   65.75 inches  Weight:  153.0 pounds      Physical Exam     General           General appearance:  no acute distress    Head           Inspection:   normal    Eyes           External:   EOM intact    ENT           Dental:   adequate dentition    Chest           Lungs:  clear to auscultation          Heart:  regular rate and rhythm    Extremeties           Extremeties:  0 edema    Neurological           Reflexes:  2+ and symmetric with no pathological reflexes    Psych           Orientation:  oriented to time, place, and person          Mood:  no appearance of anxiety, depression, or agitation    Lymph           Inguinal:  no inguinal adenopathy    Skin           Inspection:  no rashes, suspicious lesions, or ulcerations    Abdomen           Abdomen:  gravid    Pelvic Exam           EGBUS:  no lesions          Vagina:  normal appearing without lesions or discharge          Uterus:  gravid          Adnexa:  non palpable          Cervix:  no lesions or discharge                  Dilation: : 4cm                  Effacement:  80%                  Station:  -2                  Presentation:  cephalic                  Membranes:  intact                  Pelvis:  adequate                  Consistency:  soft                  Position: anterior            Impression & Recommendations:    Problem # 1:  abdominal Pain Pregnancy  Cervix 4.5 cm /80/-2 with painful contractions c/w active labor    Problem # 2:  Threatened PTL (ICD-644.03) (MED41-C01.03)    Problem # 3:  Size less than dates_(9/14) 13%_repeat at 37wks_ (QXX-025.03) (SGC31-X63.5930)    Problem # 4:  Late/insufficient prenatal care (ICD-V23.7) (WKE79-A46.33)    Problem # 5:  Echogenic bowel, resolved (HGK-037.77) (ZWG78-K43.8xx0)    Problem # 6:  Normal pregnancy multigravida, GIRL!! (ICD-V22.1) (DMR19-Q61.69)    Problem # 7:  *Note: Parvo immune 11/15 (ZVJ-666.34) (SBL01-V73.89)      Medications (at conclusion of this visit)    08/10/2017 ZOLOFT 50 MG ORAL TABS (SERTRALINE HCL) 1/2 tablet daily x1 wk. 1 tablet daily if sxs persist.  06/02/2017 PNV-DHA CAPS (PRENAT W/O J-VK-[id number]-FA-DHA CAPS)           LABORATORY DATA   TEST DATE RESULT   Group B Strep culture 09/14/2017 Negative                                   (Group B Strep Culture Result Field)   Blood Type 05/11/2017 A                                             (Blood Type Result Field)   Rh 05/11/2017 Positive (Rh Result Field)   Rhogam Inj Given 06/23/2016 *   Tdap Vaccine Given 07/25/2017 Vacc. [address]   Antibody Screen 07/25/2017 Negative   Rubella  [facility] Reference Ranges On or After 3/10/14                  <0.90              Non-immune      0.90 - 0.99     Equivocal      >0.99              Immune    [facility] Reference Ranges  Before 3/10/14           <5                 Non-immune             5 - 9               Equivocal            >9                 Immune  [facility] Reference Ranges       < Or = 0.90       Negative             0.91-1.09          Equivocal            > Or = 1.10       Positive   05/11/2017     1.86     TPA (T Pallidum Antibodies) 07/25/2017 Negative   Serology (RPR) 06/23/2016 *   HBsAg 05/11/2017 Negative   HIV 05/11/2017 Non Reactive   Hemoglobin 07/25/2017 11.2   Hematocrit 07/25/2017 35.3   Platelets 26/81/6011 287 X10E3/UL   TSH 06/23/2016 *   Urine Culture 05/11/2017 Negative   GC DNA Probe 05/11/2017 Negative   Chlamydia DNA 05/11/2017 Negative   PAP 11/19/2015 NIL   Flu Vaccine Given 06/23/2016 *   HGBA1C 06/23/2016 *   HGB Electro     T4, Free 06/23/2016 *   BG Fasting 06/23/2016 *   GTT 1H 50G 07/25/2017 128   GTT 1H 100G 06/23/2016 *   GTT 2H 100G 06/23/2016 *   GTT 3H 100G 06/23/2016 *   Glucose Plasma 06/23/2016 *   CF Accept or Decline 11/19/2015 accepted   CF Screen Result 11/19/2015 Negative   Nuchal Trans 06/02/2017 3.99^3. 99 mm&millimeters   AFP Only 01/12/2016 *Screen Negative*   Tetra 05/11/2017 *Screen Negative*   AFP Serum 06/23/2016 *   CVS 06/23/2016 *   AFP Amniotic 06/23/2016 *   Amnio Karyo 06/23/2016 *   FISH 06/23/2016 *   GC Culture 06/23/2016 *   Chlamydia Cult 06/23/2016 *   Ureaplasma     Mycoplasma     WBC 05/11/2017 8.0 X10E3/UL   RBC 05/11/2017 3.85 X10E6/UL   MCV 05/11/2017 90   MCH 05/11/2017 29.1   MCHC RBC 05/11/2017 32.3     ULTRASOUND DATA   TEST DATE RESULT   Estimated Fetal Weight 09/14/2017 2240.25033421^2241 g&grams Weight % 09/14/2017 13^13% %&percent                                                YENY 09/22/2017 12^12 cm&centimeters                    BPP 09/22/2017 8^8 [n/a]&Not applicable   Cervical Length (mm) 12/22/2015 39.000           Electronically signed by Anton Baron on 09/30/2017 at 9:08 PM    ________________________________________________________________________

## 2017-10-01 NOTE — ANESTHESIA PROCEDURE NOTES
Epidural Block    Start time: 10/1/2017 10:32 AM  End time: 10/1/2017 10:45 AM  Performed by: Suzi Winston  Authorized by: Suzi Winston     Pre-Procedure  Indication: labor epidural    Preanesthetic Checklist: patient identified, risks and benefits discussed, anesthesia consent, site marked, patient being monitored, timeout performed and anesthesia consent    Timeout Time: 22:45        Epidural:   Patient position:  Left lateral decubitus  Prep region:  Lumbar  Prep: Betadine    Location:  L2-3    Needle and Epidural Catheter:   Needle Type:  Tuohy  Needle Gauge:  17 G  Injection Technique:  Loss of resistance using air  Attempts:  1  Catheter Size:  19 G  Catheter at Skin Depth (cm):  11  Depth in Epidural Space (cm):  6  Events: no blood with aspiration, no cerebrospinal fluid with aspiration, no paresthesia and negative aspiration test    Test Dose:  Negative and lidocaine 1.5% w/ epi    Assessment:   Catheter Secured:  Tegaderm and tape  Insertion:  Uncomplicated  Patient tolerance:  Patient tolerated the procedure well with no immediate complications

## 2017-10-02 PROCEDURE — 74011250637 HC RX REV CODE- 250/637: Performed by: OBSTETRICS & GYNECOLOGY

## 2017-10-02 PROCEDURE — 65410000002 HC RM PRIVATE OB

## 2017-10-02 RX ADMIN — IBUPROFEN 800 MG: 400 TABLET, FILM COATED ORAL at 14:39

## 2017-10-02 RX ADMIN — OXYCODONE HYDROCHLORIDE AND ACETAMINOPHEN 2 TABLET: 5; 325 TABLET ORAL at 03:21

## 2017-10-02 RX ADMIN — IBUPROFEN 800 MG: 400 TABLET, FILM COATED ORAL at 06:32

## 2017-10-02 RX ADMIN — OXYCODONE HYDROCHLORIDE AND ACETAMINOPHEN 2 TABLET: 5; 325 TABLET ORAL at 16:58

## 2017-10-02 RX ADMIN — SERTRALINE HYDROCHLORIDE 50 MG: 50 TABLET ORAL at 09:59

## 2017-10-02 RX ADMIN — OXYCODONE HYDROCHLORIDE AND ACETAMINOPHEN 2 TABLET: 5; 325 TABLET ORAL at 21:24

## 2017-10-02 RX ADMIN — OXYCODONE HYDROCHLORIDE AND ACETAMINOPHEN 2 TABLET: 5; 325 TABLET ORAL at 12:20

## 2017-10-02 RX ADMIN — IBUPROFEN 800 MG: 400 TABLET, FILM COATED ORAL at 22:46

## 2017-10-02 RX ADMIN — DOCUSATE SODIUM 100 MG: 100 CAPSULE, LIQUID FILLED ORAL at 21:24

## 2017-10-02 RX ADMIN — OXYCODONE HYDROCHLORIDE AND ACETAMINOPHEN 2 TABLET: 5; 325 TABLET ORAL at 07:43

## 2017-10-02 NOTE — PROGRESS NOTES
Post-Partum Day Number 1 Progress Note    Lexi Welsh     Assessment: Doing well, post partum day 1    Plan:  1. Continue routine postpartum and perineal care as well as maternal education. 2. N/A     Information for the patient's :  Domingo Arrieta [[id number]]   Vaginal, Spontaneous Delivery   Patient doing well without significant complaint. Voiding without difficulty, normal lochia. Vitals:  Visit Vitals    /68 (BP 1 Location: Right arm, BP Patient Position: At rest)    Pulse 78    Temp 98.4 °F (36.9 °C)    Resp 16    SpO2 98%    Breastfeeding Unknown     Temp (24hrs), Av.3 °F (36.8 °C), Min:98.2 °F (36.8 °C), Max:98.4 °F (36.9 °C)        Exam:   Patient without distress. Abdomen soft, fundus firm, nontender                Lower extremities are symmetric without tenderness, cords or erythema. Labs:     Lab Results   Component Value Date/Time    WBC 12.5 2017 05:50 PM    WBC 9.8 2016 07:30 AM    HGB 13.2 2017 05:50 PM    HGB 11.4 2016 07:30 AM    HCT 39.0 2017 05:50 PM    HCT 33.8 2016 07:30 AM    PLATELET 002  05:50 PM    PLATELET 402  07:30 AM       No results found for this or any previous visit (from the past 24 hour(s)).

## 2017-10-02 NOTE — PROGRESS NOTES
Dr. Greg Gonzalez called because patient stated that the pain she was having in her back and perineum was not being relieved by the Motrin 800 mg po and the one percocet tablet given. Stated the pain had gotten worse . Patient had k pad also. Patient requested to have two percocet if possible. Dr. Greg Gonzalez gave order for percocet 2 tabs po q 4 prn.

## 2017-10-02 NOTE — LACTATION NOTE
This note was copied from a baby's chart. Baby born vaginally yesterday to a  at 40 4/7 weeks gestation. Mom states the baby has been latching but she is having some difficulty getting her latched deeply on the left breast. I watched mom latch and gave her some tips on getting baby latched deeply. Baby began sucking rhythmically with frequent audible swallows. Feeding Plan: Mother will keep baby skin to skin as often as possible, feed on demand,  respond to feeding cues, obtain latch, listen for audible swallowing, be aware of signs of oxytocin release/ cramping,thrist,sleepyness while breastfeeding, offer both breasts,and will not limit feedings.

## 2017-10-03 VITALS
RESPIRATION RATE: 16 BRPM | HEART RATE: 80 BPM | OXYGEN SATURATION: 98 % | DIASTOLIC BLOOD PRESSURE: 92 MMHG | SYSTOLIC BLOOD PRESSURE: 124 MMHG | TEMPERATURE: 98.2 F

## 2017-10-03 PROBLEM — R10.9 ABDOMINAL PAIN DURING PREGNANCY IN THIRD TRIMESTER: Status: RESOLVED | Noted: 2017-09-30 | Resolved: 2017-10-03

## 2017-10-03 PROBLEM — O26.893 ABDOMINAL PAIN DURING PREGNANCY IN THIRD TRIMESTER: Status: RESOLVED | Noted: 2017-09-30 | Resolved: 2017-10-03

## 2017-10-03 PROCEDURE — 74011250637 HC RX REV CODE- 250/637: Performed by: OBSTETRICS & GYNECOLOGY

## 2017-10-03 RX ORDER — OXYCODONE AND ACETAMINOPHEN 5; 325 MG/1; MG/1
1 TABLET ORAL
Qty: 20 TAB | Refills: 0 | Status: SHIPPED | OUTPATIENT
Start: 2017-10-03

## 2017-10-03 RX ORDER — OXYCODONE AND ACETAMINOPHEN 5; 325 MG/1; MG/1
1 TABLET ORAL
Qty: 28 TAB | Refills: 0 | Status: SHIPPED | OUTPATIENT
Start: 2017-10-03 | End: 2017-10-03

## 2017-10-03 RX ORDER — IBUPROFEN 600 MG/1
600 TABLET ORAL
Qty: 36 TAB | Refills: 2 | Status: SHIPPED | OUTPATIENT
Start: 2017-10-03

## 2017-10-03 RX ADMIN — IBUPROFEN 800 MG: 400 TABLET, FILM COATED ORAL at 07:07

## 2017-10-03 RX ADMIN — OXYCODONE HYDROCHLORIDE AND ACETAMINOPHEN 2 TABLET: 5; 325 TABLET ORAL at 06:06

## 2017-10-03 RX ADMIN — SERTRALINE HYDROCHLORIDE 50 MG: 50 TABLET ORAL at 09:23

## 2017-10-03 RX ADMIN — OXYCODONE HYDROCHLORIDE AND ACETAMINOPHEN 2 TABLET: 5; 325 TABLET ORAL at 01:59

## 2017-10-03 RX ADMIN — OXYCODONE HYDROCHLORIDE AND ACETAMINOPHEN 2 TABLET: 5; 325 TABLET ORAL at 10:47

## 2017-10-03 NOTE — PROGRESS NOTES
Post-Partum Day Number 2 Progress Note    Ronjihan Dunn     Assessment:   Hospital Problems  Date Reviewed: 10/1/2017          Codes Class Noted POA    Abdominal pain during pregnancy in third trimester ICD-10-CM: O26.893, R10.9  ICD-9-CM: 646.83, 789.00  2017 Unknown            Doing well, post partum day 2    Plan:   1. Discharge home today  2. Follow up in office in 6 weeks with Isis Bradley MD   3. Post partum activity advised, diet as tolerated  4. Discharge Medications: ibuprofen, percocet and medications prior to admission    Information for the patient's :  Renetta Navarro [[id number]]   Vaginal, Spontaneous Delivery   Patient doing well without significant complaint. Voiding without difficulty, normal lochia. Current Facility-Administered Medications   Medication Dose Route Frequency    sodium chloride (NS) flush 5-10 mL  5-10 mL IntraVENous Q8H    ibuprofen (MOTRIN) tablet 800 mg  800 mg Oral Q8H    sertraline (ZOLOFT) tablet 50 mg  50 mg Oral DAILY    lactated Ringers infusion  125 mL/hr IntraVENous CONTINUOUS    fentaNYL 2mcg/mL - bupivacaine 0.125% pf epidural  1-16 mL/hr Epidural CONTINUOUS       Vitals:  Visit Vitals    BP (!) 124/92 (BP 1 Location: Right arm, BP Patient Position: At rest)    Pulse 80    Temp 98.2 °F (36.8 °C)    Resp 16    SpO2 98%    Breastfeeding Unknown     Temp (24hrs), Av.3 °F (36.8 °C), Min:98.2 °F (36.8 °C), Max:98.3 °F (36.8 °C)      Exam:         Patient without distress. Abdomen soft, fundus firm, nontender                 Lower extremities are negative for swelling, cords or tenderness.     Labs:     Lab Results   Component Value Date/Time    WBC 12.5 2017 05:50 PM    WBC 9.8 2016 07:30 AM    HGB 13.2 2017 05:50 PM    HGB 11.4 2016 07:30 AM    HCT 39.0 2017 05:50 PM    HCT 33.8 2016 07:30 AM    PLATELET 306  05:50 PM    PLATELET 661  07:30 AM       No results found for this or any previous visit (from the past 24 hour(s)).

## 2017-10-03 NOTE — LACTATION NOTE
This note was copied from a baby's chart. Baby nursing well and has improved throughout post partum stay, deep latch maintained, mother is comfortable, (mom states that breasts are getting heavy) milk is in transition, baby feeding vigorously with rhythmic suck, swallow, breathe pattern, with audible swallowing, and evident milk transfer, both breasts offerd, baby is asleep following feeding. Baby is feeding on demand, voiding and stools present as appropriate over the last 24 hours. Breasts may become engorged when milk \"comes in\". How milk is made / normal phases of milk production, supply and demand discussed. Taught care of engorged breasts - frequent breastfeeding encouraged, warm compresses and breast massage ac. Then nurse the baby or pump. Apply cold compresses pc x 15 minutes a few times a day for swelling or discomfort. May need to do this care for a couple of days. Discussed prevention and treatment of mastitis. Infant with a 9.2% weight loss. Encouraged mom to use breast massage while nursing to facilitate her milk coming in. Mom to follow up with pediatrician tomorrow for weight check.

## 2017-10-03 NOTE — DISCHARGE SUMMARY
Obstetrical Discharge Summary     Name: Cecilia Blackwell MRN: [id number]  SSN: [id number]    YOB: 1985  Age: 27 y.o. Sex: female      Admit Date: 2017    Discharge Date: 10/3/2017    Admitting Physician: Kurt Lee MD     Attending Physician:  Donna Jarrell MD     Discharge Diagnoses:   Information for the patient's :  Kathleen Young [[id number]]   Delivery of a 2.44 kg female infant via Vaginal, Spontaneous Delivery on 10/1/2017 at 3:56 AM  by . Apgars were 8 and 9. Additional Diagnoses:   Problem List as of 10/3/2017  Date Reviewed: 10/1/2017          Codes Class Noted - Resolved    Pregnancy ICD-10-CM: Z34.90  ICD-9-CM: V22.2  2016 - Present        RESOLVED: Abdominal pain during pregnancy in third trimester ICD-10-CM: O26.893, R10.9  ICD-9-CM: 646.83, 789.00  2017 - 10/3/2017              Lab Results   Component Value Date/Time    Rubella, External Immune 2017    GrBStrep, External Negative 2017     Recent Labs      17   1750   HGB  13.2       Hospital Course: Normal hospital course following the delivery. Patient Instructions:   Current Discharge Medication List      START taking these medications    Details   ibuprofen (MOTRIN) 600 mg tablet Take 1 Tab by mouth every eight (8) hours as needed for Pain. Qty: 36 Tab, Refills: 2      oxyCODONE-acetaminophen (PERCOCET) 5-325 mg per tablet Take 1 Tab by mouth every four (4) hours as needed for Pain. Max Daily Amount: 6 Tabs. Qty: 28 Tab, Refills: 0         CONTINUE these medications which have NOT CHANGED    Details   acetaminophen (TYLENOL) 325 mg tablet Take  by mouth every four (4) hours as needed for Pain. Indications: Pain      sertraline (ZOLOFT) 50 mg tablet Take 50 mg by mouth daily. PRENATAL VIT W-CA,FE,FA,<1 MG, (PRENATAL #2 PO) Take  by mouth. Reference my discharge instructions.     Follow-up Appointments   Procedures    FOLLOW UP VISIT Appointment in: 6 Weeks Standing Status:   Standing     Number of Occurrences:   1     Order Specific Question:   Appointment in     Answer:   6 Weeks        Signed By:  Darlen Pallas, MD     October 3, 2017                       BST

## 2017-10-03 NOTE — ROUTINE PROCESS
Bedside/shift change SBAR received from GRETCHEN Marsh RN    I have reviewed discharge instructions with the patient. The patient verbalized understanding. E sign pad not working.

## 2017-10-03 NOTE — DISCHARGE INSTRUCTIONS
POSTPARTUM DISCHARGE INSTRUCTIONS       Name:  Anton Baez  YOB: 1985  Admission Diagnosis:  Abdominal pain during pregnancy in third trimester     Discharge Diagnosis:    Problem List as of 10/3/2017  Date Reviewed: 10/1/2017          Codes Class Noted - Resolved    Pregnancy ICD-10-CM: Z34.90  ICD-9-CM: V22.2  6/22/2016 - Present        RESOLVED: Abdominal pain during pregnancy in third trimester ICD-10-CM: O26.893, R10.9  ICD-9-CM: 646.83, 789.00  9/30/2017 - 10/3/2017            Attending Physician:  Alfred Guerra MD    Delivery Type:  Vaginal Childbirth: What To Expect At Home    Your Recovery: Your body will slowly heal in the next few weeks. It is easy to get too tired and overwhelmed during the first weeks after your baby is born. Changes in your hormones can shift your mood without warning. You may find it hard to meet the extra demands on your energy and time. Take it easy on yourself. Follow-up care is a key part of your treatment and safety. Be sure to make and go to all appointments, and call your doctor if you are having problems. It's also a good idea to know your test results and keep a list of the medicines you take. How can you care for yourself at home? Vaginal bleeding and cramps  · After delivery, you will have a bloody discharge from the vagina. This will turn pink within a week and then white or yellow after about 10 days. It may last for 2 to 4 weeks or longer, until the uterus has healed. Use pads instead of tampons until you stop bleeding. · Do not worry if you pass some blood clots, as long as they are smaller than a golf ball. If you have a tear or stitches in your vaginal area, change the pad at least every 4 hours to prevent soreness and infection. · You may have cramps for the first few days after childbirth. These are normal and occur as the uterus shrinks to normal size.  Take an over-the-counter pain medicine, such as acetaminophen (Tylenol), ibuprofen (Advil, Motrin), or naproxen (Aleve), for cramps. Read and follow all instructions on the label. Do not take aspirin, because it can cause more bleeding. Do not take acetaminophen (Tylenol) and other acetaminophen containing medications (i.e. Percocet) at the same time. Breast fullness  · Your breasts may overfill (engorge) in the first few days after delivery. To help milk flow and to relieve pain, warm your breasts in the shower or by using warm, moist towels before nursing. · If you are not nursing, do not put warmth on your breasts or touch your breasts. Wear a tight bra or sports bra and use ice until the fullness goes away. This usually takes 2 to 3 days. · Put ice or a cold pack on your breast after nursing to reduce swelling and pain. Put a thin cloth between the ice and your skin. Activity  · Eat a balanced diet. Do not try to lose weight by cutting calories. Keep taking your prenatal vitamins, or take a multivitamin. · Get as much rest as you can. Try to take naps when your baby sleeps during the day. · Get some exercise every day. But do not do any heavy exercise until your doctor says it is okay. · Wait until you are healed (about 4 to 6 weeks) before you have sexual intercourse. Your doctor will tell you when it is okay to have sex. · Talk to your doctor about birth control. You can get pregnant even before your period returns. Also, you can get pregnant while you are breast-feeding. Mental Health  · Many women get the \"baby blues\" during the first few days after childbirth. You may lose sleep, feel irritable, and cry easily. You may feel happy one minute and sad the next. Hormone changes are one cause of these emotional changes. Also, the demands of a new baby, along with visits from relatives or other family needs, add to a mother's stress. The \"baby blues\" often peak around the fourth day. Then they ease up in less than 2 weeks.   · If your moodiness or anxiety lasts for more than 2 weeks, or if you feel like life is not worth living, you may have postpartum depression. This is different for each mother. Some mothers with serious depression may worry intensely about their infant's well-being. Others may feel distant from their child. Some mothers might even feel that they might harm their baby. A mother may have signs of paranoia, wondering if someone is watching her. · With all the changes in your life, you may not know if you are depressed. Pregnancy sometimes causes changes in how you feel that are similar to the symptoms of depression. · Symptoms of depression include:  · Feeling sad or hopeless and losing interest in daily activities. These are the most common symptoms of depression. · Sleeping too much or not enough. · Feeling tired. You may feel as if you have no energy. · Eating too much or too little. · [employer] ([employer]) offers a Warm line; Chat with the Expert phone sessions; Information and Articles about Pregnancy and Postpartum Mood Disorders; Comprehensive List of Free Support Groups; Knowledgeable local coordinators who will offer support, information, and resources; Guide to Resources on [employer]; Calendar of events in the  mood disorders community; Latest News and Research; and [facility] [address] for [employer]. Remember - You are not alone; You are not to blame; With help, you will be well. 8-242-715-PPD(1580). WWW. POSTPARTUM. NET   · Writing or talking about death, such as writing suicide notes or talking about guns, knives, or pills. Keep the numbers for these national suicide hotlines: 0-522-276-TALK (5-389.986.2841) and 9-476-AMGJODE (1-873.183.3392). If you or someone you know talks about suicide or feeling hopeless, get help right away. Constipation and Hemorrhoids  · Drink plenty of fluids, enough so that your urine is light yellow or clear like water.  If you have kidney, heart, or liver disease and have to limit fluids, talk with your doctor before you increase the amount of fluids you drink. · Eat plenty of fiber each day. Have a bran muffin or bran cereal for breakfast, and try eating a piece of fruit for a mid-afternoon snack. · For painful, itchy hemorrhoids, put ice or a cold pack on the area several times a day for 10 minutes at a time. Follow this by putting a warm compress on the area for another 10 to 20 minutes or by sitting in a shallow, warm bath. When should you call for help? Call 911 anytime you think you may need emergency care. For example, call if:  · You are thinking of hurting yourself, your baby, or anyone else. · You passed out (lost consciousness). · You have symptoms of a blood clot in your lung (called a pulmonary embolism). These may include:    · Sudden chest pain. · Trouble breathing. · Coughing up blood. Call your doctor now or seek immediate medical care if:  · You have severe vaginal bleeding. · You are soaking through a pad each hour for 2 or more hours. · Your vaginal bleeding seems to be getting heavier or is still bright red 4 days after delivery. · You are dizzy or lightheaded, or you feel like you may faint. · You are vomiting or cannot keep fluids down. · You have a fever. · You have new or more belly pain. · You pass tissue (not just blood). · Your vaginal discharge smells bad. · Your belly feels tender or full and hard. · Your breasts are continuously painful or red. · You feel sad, anxious, or hopeless for more than a few days. · You have sudden, severe pain in your belly. · You have symptoms of a blood clot in your leg (called a deep vein thrombosis),          such as:  · Pain in your calf, back of the knee, thigh, or groin. · Redness and swelling in your leg or groin. · You have symptoms of preeclampsia, such as:  · Sudden swelling of your face, hands, or feet. · New vision problems (such as dimness or blurring). · A severe headache.   · Your blood pressure is higher than it should be or rises suddenly. · You have new nausea or vomiting. Watch closely for changes in your health, and be sure to contact your doctor if you have any problems. Additional Information:  Postpartum Support    PARENTS:  Are you feeling sad or depressed? Is it difficult for you to enjoy yourself? Do you feel more irritable or tense? Do you feel anxious or panicky? Are you having difficulty bonding with your baby? Do you feel as if you are \"out of control\" or \"going crazy\"? Are you worried that you might hurt your baby or yourself? FAMILIES: Do you worry that something is wrong but don't know how to help? Do you think that your partner or spouse is having problems coping? Are you worried that it may never get better? While many women experience some mild mood change or \"the blues\" during or after the birth of a child, 1 in 9 women experience more significant symptoms of depression or anxiety. 1 in 10 Dads become depressed during the first year. Things you can do  Being a good parent includes taking care of yourself. If you take care of yourself, you will be able to take better care of your baby and your family. · Talk to a counselor or healthcare provider who has training in  mood and anxiety problems. · Learn as much as you can about pregnancy and postpartum depression and anxiety. · Get support from family and friends. Ask for help when you need it. · Join a support group in your area or online. · Keep active by walking, stretching or whatever form of exercise helps you to feel better. · Get enough rest and time for yourself. · Eat a healthy diet. · Don't give up! It may take more than one try to get the right help you need.        These are general instructions for a healthy lifestyle:    No smoking/ No tobacco products/ Avoid exposure to second hand smoke    Surgeon General's Warning:  Quitting smoking now greatly reduces serious risk to your health. Obesity, smoking, and sedentary lifestyle greatly increases your risk for illness    A healthy diet, regular physical exercise & weight monitoring are important for maintaining a healthy lifestyle    Recognize signs and symptoms of STROKE:    F-face looks uneven    A-arms unable to move or move unevenly    S-speech slurred or non-existent    T-time-call 911 as soon as signs and symptoms begin - DO NOT go       back to bed or wait to see if you get better - TIME IS BRAIN. I have had the opportunity to make my options or choices for discharge. I have received and understand these instructions.